# Patient Record
Sex: MALE | Race: WHITE | NOT HISPANIC OR LATINO
[De-identification: names, ages, dates, MRNs, and addresses within clinical notes are randomized per-mention and may not be internally consistent; named-entity substitution may affect disease eponyms.]

---

## 2017-05-08 ENCOUNTER — RX RENEWAL (OUTPATIENT)
Age: 57
End: 2017-05-08

## 2017-07-05 ENCOUNTER — EMERGENCY (EMERGENCY)
Facility: HOSPITAL | Age: 57
LOS: 0 days | Discharge: HOME | End: 2017-07-05
Attending: EMERGENCY MEDICINE | Admitting: EMERGENCY MEDICINE
Payer: COMMERCIAL

## 2017-07-05 VITALS
SYSTOLIC BLOOD PRESSURE: 103 MMHG | HEART RATE: 64 BPM | OXYGEN SATURATION: 98 % | RESPIRATION RATE: 13 BRPM | DIASTOLIC BLOOD PRESSURE: 85 MMHG

## 2017-07-05 VITALS — HEIGHT: 67 IN | WEIGHT: 210.1 LBS

## 2017-07-05 DIAGNOSIS — R07.89 OTHER CHEST PAIN: ICD-10-CM

## 2017-07-05 DIAGNOSIS — R07.9 CHEST PAIN, UNSPECIFIED: ICD-10-CM

## 2017-07-05 DIAGNOSIS — E78.5 HYPERLIPIDEMIA, UNSPECIFIED: ICD-10-CM

## 2017-07-05 LAB
ALBUMIN SERPL ELPH-MCNC: 3.4 G/DL — SIGNIFICANT CHANGE UP (ref 3.3–5)
ALP SERPL-CCNC: 85 U/L — SIGNIFICANT CHANGE UP (ref 40–120)
ALT FLD-CCNC: 32 U/L — SIGNIFICANT CHANGE UP (ref 12–78)
ANION GAP SERPL CALC-SCNC: 8 MMOL/L — SIGNIFICANT CHANGE UP (ref 5–17)
APTT BLD: 31.4 SEC — SIGNIFICANT CHANGE UP (ref 27.5–37.4)
AST SERPL-CCNC: 14 U/L — LOW (ref 15–37)
BASOPHILS # BLD AUTO: 0.1 K/UL — SIGNIFICANT CHANGE UP (ref 0–0.2)
BASOPHILS NFR BLD AUTO: 1.6 % — SIGNIFICANT CHANGE UP (ref 0–2)
BILIRUB SERPL-MCNC: 0.4 MG/DL — SIGNIFICANT CHANGE UP (ref 0.2–1.2)
BUN SERPL-MCNC: 17 MG/DL — SIGNIFICANT CHANGE UP (ref 7–23)
CALCIUM SERPL-MCNC: 8.6 MG/DL — SIGNIFICANT CHANGE UP (ref 8.5–10.1)
CHLORIDE SERPL-SCNC: 109 MMOL/L — HIGH (ref 96–108)
CO2 SERPL-SCNC: 25 MMOL/L — SIGNIFICANT CHANGE UP (ref 22–31)
CREAT SERPL-MCNC: 1.29 MG/DL — SIGNIFICANT CHANGE UP (ref 0.5–1.3)
EOSINOPHIL # BLD AUTO: 0.1 K/UL — SIGNIFICANT CHANGE UP (ref 0–0.5)
EOSINOPHIL NFR BLD AUTO: 1.5 % — SIGNIFICANT CHANGE UP (ref 0–6)
GLUCOSE SERPL-MCNC: 114 MG/DL — HIGH (ref 70–99)
HCT VFR BLD CALC: 47.2 % — SIGNIFICANT CHANGE UP (ref 39–50)
HGB BLD-MCNC: 16.2 G/DL — SIGNIFICANT CHANGE UP (ref 13–17)
INR BLD: 0.95 RATIO — SIGNIFICANT CHANGE UP (ref 0.88–1.16)
LYMPHOCYTES # BLD AUTO: 2.5 K/UL — SIGNIFICANT CHANGE UP (ref 1–3.3)
LYMPHOCYTES # BLD AUTO: 32.7 % — SIGNIFICANT CHANGE UP (ref 13–44)
MCHC RBC-ENTMCNC: 29.4 PG — SIGNIFICANT CHANGE UP (ref 27–34)
MCHC RBC-ENTMCNC: 34.4 GM/DL — SIGNIFICANT CHANGE UP (ref 32–36)
MCV RBC AUTO: 85.5 FL — SIGNIFICANT CHANGE UP (ref 80–100)
MONOCYTES # BLD AUTO: 0.8 K/UL — SIGNIFICANT CHANGE UP (ref 0–0.9)
MONOCYTES NFR BLD AUTO: 10 % — SIGNIFICANT CHANGE UP (ref 2–14)
NEUTROPHILS # BLD AUTO: 4.1 K/UL — SIGNIFICANT CHANGE UP (ref 1.8–7.4)
NEUTROPHILS NFR BLD AUTO: 54.2 % — SIGNIFICANT CHANGE UP (ref 43–77)
PLATELET # BLD AUTO: 180 K/UL — SIGNIFICANT CHANGE UP (ref 150–400)
POTASSIUM SERPL-MCNC: 4.1 MMOL/L — SIGNIFICANT CHANGE UP (ref 3.5–5.3)
POTASSIUM SERPL-SCNC: 4.1 MMOL/L — SIGNIFICANT CHANGE UP (ref 3.5–5.3)
PROT SERPL-MCNC: 7.3 GM/DL — SIGNIFICANT CHANGE UP (ref 6–8.3)
PROTHROM AB SERPL-ACNC: 10.2 SEC — SIGNIFICANT CHANGE UP (ref 9.8–12.7)
RBC # BLD: 5.52 M/UL — SIGNIFICANT CHANGE UP (ref 4.2–5.8)
RBC # FLD: 11.6 % — SIGNIFICANT CHANGE UP (ref 10.3–14.5)
SODIUM SERPL-SCNC: 142 MMOL/L — SIGNIFICANT CHANGE UP (ref 135–145)
TROPONIN I SERPL-MCNC: <0.015 NG/ML — SIGNIFICANT CHANGE UP (ref 0.01–0.04)
TROPONIN I SERPL-MCNC: <0.015 NG/ML — SIGNIFICANT CHANGE UP (ref 0.01–0.04)
WBC # BLD: 7.5 K/UL — SIGNIFICANT CHANGE UP (ref 3.8–10.5)
WBC # FLD AUTO: 7.5 K/UL — SIGNIFICANT CHANGE UP (ref 3.8–10.5)

## 2017-07-05 PROCEDURE — 93010 ELECTROCARDIOGRAM REPORT: CPT

## 2017-07-05 PROCEDURE — 71010: CPT | Mod: 26

## 2017-07-05 PROCEDURE — 99284 EMERGENCY DEPT VISIT MOD MDM: CPT | Mod: 25

## 2017-07-05 RX ORDER — SODIUM CHLORIDE 9 MG/ML
3 INJECTION INTRAMUSCULAR; INTRAVENOUS; SUBCUTANEOUS ONCE
Qty: 0 | Refills: 0 | Status: COMPLETED | OUTPATIENT
Start: 2017-07-05 | End: 2017-07-05

## 2017-07-05 RX ORDER — ASPIRIN/CALCIUM CARB/MAGNESIUM 324 MG
325 TABLET ORAL ONCE
Qty: 0 | Refills: 0 | Status: COMPLETED | OUTPATIENT
Start: 2017-07-05 | End: 2017-07-05

## 2017-07-05 RX ADMIN — SODIUM CHLORIDE 3 MILLILITER(S): 9 INJECTION INTRAMUSCULAR; INTRAVENOUS; SUBCUTANEOUS at 07:29

## 2017-07-05 RX ADMIN — Medication 325 MILLIGRAM(S): at 08:26

## 2017-07-05 NOTE — ED PROVIDER NOTE - OBJECTIVE STATEMENT
Pt notes that he performed yard work this week but denies trauma and pain is not worse with exertion or better with rest.

## 2017-07-05 NOTE — ED ADULT NURSE NOTE - OBJECTIVE STATEMENT
58 y/o male awake alert and oriented x4 presents to left sided chest pain radiates to left rib x 1 week. pt states chest pressure and palpitation started since yardening 1 week ago. 58 y/o male awake alert and oriented x4 presents to left sided chest pain radiates to left rib x 1 week. pt states chest pressure and palpitation started since yardening 1 week ago. Pt was getting ready for work today and [place] and came in for further evaluation. Vitals stable. c/o 5/10 chest pressure. Denies radiation to jaws and arms. wife at bedside. Denies sob,ha,dz,n/v/d/fever/chills or urinary sx. Denies cardiac hx.

## 2017-07-05 NOTE — ED ADULT TRIAGE NOTE - CHIEF COMPLAINT QUOTE
Chest pain and palpitations x 1 week, however this morning the palpitations are a/w left arm discomfort.  Patient denies SOB

## 2017-07-05 NOTE — ED PROVIDER NOTE - MEDICAL DECISION MAKING DETAILS
The patient's risk factors for ACS were reviewed as well as the EKG.  The CXR assists in r/o Pneumonia, Pneumothorax, Esophageal Tears.  The patient does not appear to have a Pulmonary Embolism based on the Wells Score and PERC rule and there is no apparent DVT.  There are no signs of Pericarditis, Endocarditis, or Myocarditis based on risk factor analysis.  There is no fever.  There does not appear to be an Aortic Dissection either based on history, physical exam, and signs.

## 2017-07-05 NOTE — ED PROVIDER NOTE - CHPI ED SYMPTOMS NEG
no cough/no syncope/no fever/no shortness of breath/no back pain/no dizziness/no nausea/no vomiting/no chills/no diaphoresis

## 2017-07-14 ENCOUNTER — APPOINTMENT (OUTPATIENT)
Dept: FAMILY MEDICINE | Facility: CLINIC | Age: 57
End: 2017-07-14

## 2017-07-14 VITALS
HEART RATE: 75 BPM | DIASTOLIC BLOOD PRESSURE: 70 MMHG | HEIGHT: 67 IN | BODY MASS INDEX: 34.84 KG/M2 | SYSTOLIC BLOOD PRESSURE: 110 MMHG | WEIGHT: 222 LBS

## 2017-07-14 VITALS — DIASTOLIC BLOOD PRESSURE: 68 MMHG | SYSTOLIC BLOOD PRESSURE: 95 MMHG

## 2017-07-14 DIAGNOSIS — M77.01 MEDIAL EPICONDYLITIS, RIGHT ELBOW: ICD-10-CM

## 2017-07-14 DIAGNOSIS — R07.89 OTHER CHEST PAIN: ICD-10-CM

## 2017-07-14 RX ORDER — ASPIRIN ENTERIC COATED TABLETS 81 MG 81 MG/1
81 TABLET, DELAYED RELEASE ORAL DAILY
Refills: 0 | Status: ACTIVE | COMMUNITY
Start: 2017-07-14

## 2017-07-25 ENCOUNTER — APPOINTMENT (OUTPATIENT)
Dept: FAMILY MEDICINE | Facility: CLINIC | Age: 57
End: 2017-07-25

## 2017-09-18 ENCOUNTER — RX RENEWAL (OUTPATIENT)
Age: 57
End: 2017-09-18

## 2017-10-23 ENCOUNTER — RX RENEWAL (OUTPATIENT)
Age: 57
End: 2017-10-23

## 2017-12-28 NOTE — ED PROVIDER NOTE - PSYCHIATRIC, MLM
COUGH/chest congestion/tightness, cough with productive greenish phlegm Alert and oriented to person, place, time/situation. normal mood and affect. no apparent risk to self or others.

## 2018-04-23 ENCOUNTER — RX RENEWAL (OUTPATIENT)
Age: 58
End: 2018-04-23

## 2018-04-30 ENCOUNTER — RX RENEWAL (OUTPATIENT)
Age: 58
End: 2018-04-30

## 2018-06-06 ENCOUNTER — APPOINTMENT (OUTPATIENT)
Dept: FAMILY MEDICINE | Facility: CLINIC | Age: 58
End: 2018-06-06
Payer: COMMERCIAL

## 2018-06-06 ENCOUNTER — NON-APPOINTMENT (OUTPATIENT)
Age: 58
End: 2018-06-06

## 2018-06-06 VITALS
HEIGHT: 67 IN | SYSTOLIC BLOOD PRESSURE: 108 MMHG | HEART RATE: 71 BPM | OXYGEN SATURATION: 100 % | DIASTOLIC BLOOD PRESSURE: 60 MMHG | WEIGHT: 223 LBS | BODY MASS INDEX: 35 KG/M2

## 2018-06-06 VITALS — DIASTOLIC BLOOD PRESSURE: 62 MMHG | SYSTOLIC BLOOD PRESSURE: 118 MMHG

## 2018-06-06 DIAGNOSIS — Z12.5 ENCOUNTER FOR SCREENING FOR MALIGNANT NEOPLASM OF PROSTATE: ICD-10-CM

## 2018-06-06 DIAGNOSIS — Z11.59 ENCOUNTER FOR SCREENING FOR OTHER VIRAL DISEASES: ICD-10-CM

## 2018-06-06 PROCEDURE — 99396 PREV VISIT EST AGE 40-64: CPT | Mod: 25

## 2018-06-06 PROCEDURE — 93000 ELECTROCARDIOGRAM COMPLETE: CPT

## 2018-06-06 PROCEDURE — 36415 COLL VENOUS BLD VENIPUNCTURE: CPT

## 2018-06-06 RX ORDER — DICLOFENAC SODIUM 10 MG/G
1 GEL TOPICAL
Qty: 100 | Refills: 3 | Status: DISCONTINUED | COMMUNITY
Start: 2017-07-14 | End: 2018-06-06

## 2018-06-06 NOTE — COUNSELING
[Activity counseling provided] : activity [___ min/wk activity recommended] : [unfilled] min/wk activity recommended [Walking] : Walking

## 2018-06-06 NOTE — PHYSICAL EXAM
[No Acute Distress] : no acute distress [Well Nourished] : well nourished [Well Developed] : well developed [Well-Appearing] : well-appearing [EOMI] : extraocular movements intact [PERRL] : pupils equal round and reactive to light [Normal Oropharynx] : the oropharynx was normal [No JVD] : no jugular venous distention [Supple] : supple [No Lymphadenopathy] : no lymphadenopathy [No Respiratory Distress] : no respiratory distress  [Clear to Auscultation] : lungs were clear to auscultation bilaterally [No Accessory Muscle Use] : no accessory muscle use [Normal Rate] : normal rate  [Regular Rhythm] : with a regular rhythm [Normal S1, S2] : normal S1 and S2 [No Carotid Bruits] : no carotid bruits [No Abdominal Bruit] : a ~M bruit was not heard ~T in the abdomen [Pedal Pulses Present] : the pedal pulses are present [No Edema] : there was no peripheral edema [No Extremity Clubbing/Cyanosis] : no extremity clubbing/cyanosis [No Palpable Aorta] : no palpable aorta [Normal Appearance] : normal in appearance [Soft] : abdomen soft [Non Tender] : non-tender [Non-distended] : non-distended [No Masses] : no abdominal mass palpated [No HSM] : no HSM [Normal Bowel Sounds] : normal bowel sounds [Normal Posterior Cervical Nodes] : no posterior cervical lymphadenopathy [Normal Anterior Cervical Nodes] : no anterior cervical lymphadenopathy [No CVA Tenderness] : no CVA  tenderness [No Spinal Tenderness] : no spinal tenderness [No Joint Swelling] : no joint swelling [Grossly Normal Strength/Tone] : grossly normal strength/tone [No Rash] : no rash [Normal Gait] : normal gait [Coordination Grossly Intact] : coordination grossly intact [No Focal Deficits] : no focal deficits [Normal Affect] : the affect was normal [Normal Mood] : the mood was normal [Normal Insight/Judgement] : insight and judgment were intact

## 2018-06-06 NOTE — HISTORY OF PRESENT ILLNESS
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  +FAST  Denies CP s/p Cardiology consult c Dr. Mayorga 1/18.  no CP/SOB c activity  pt states walks daily  c wife and dog x 1-1.5 hrs.    no dizziness no palpitations no N/V/D +BM qd-bid no bloody stools/black stools

## 2018-06-06 NOTE — HEALTH RISK ASSESSMENT
[HIV test declined] : HIV test declined [Hepatitis C test offered] : Hepatitis C test offered [None] : None [With Family] : lives with family [Employed] : employed [] :  [Sexually Active] : sexually active [Feels Safe at Home] : Feels safe at home [Fully functional (bathing, dressing, toileting, transferring, walking, feeding)] : Fully functional (bathing, dressing, toileting, transferring, walking, feeding) [Fully functional (using the telephone, shopping, preparing meals, housekeeping, doing laundry, using] : Fully functional and needs no help or supervision to perform IADLs (using the telephone, shopping, preparing meals, housekeeping, doing laundry, using transportation, managing medications and managing finances) [Change in mental status noted] : No change in mental status noted [Language] : denies difficulty with language [Behavior] : denies difficulty with behavior [Learning/Retaining New Information] : denies difficulty learning/retaining new information [Handling Complex Tasks] : denies difficulty handling complex tasks [Reasoning] : denies difficulty with reasoning [Spatial Ability and Orientation] : denies difficulty with spatial ability and orientation [Reports changes in hearing] : Reports no changes in hearing [Reports changes in vision] : Reports no changes in vision [Reports changes in dental health] : Reports no changes in dental health [ColonoscopyComments] : referral given today (Dr. Gunderson)

## 2018-06-07 ENCOUNTER — OTHER (OUTPATIENT)
Age: 58
End: 2018-06-07

## 2018-06-07 LAB
25(OH)D3 SERPL-MCNC: 64.3 NG/ML
ALBUMIN SERPL ELPH-MCNC: 4.4 G/DL
ALP BLD-CCNC: 97 U/L
ALT SERPL-CCNC: 28 U/L
ANION GAP SERPL CALC-SCNC: 15 MMOL/L
APPEARANCE: CLEAR
AST SERPL-CCNC: 19 U/L
BACTERIA: NEGATIVE
BASOPHILS # BLD AUTO: 0.03 K/UL
BASOPHILS NFR BLD AUTO: 0.5 %
BILIRUB SERPL-MCNC: 0.6 MG/DL
BILIRUBIN URINE: NEGATIVE
BLOOD URINE: NEGATIVE
BUN SERPL-MCNC: 16 MG/DL
CALCIUM SERPL-MCNC: 9.5 MG/DL
CHLORIDE SERPL-SCNC: 103 MMOL/L
CHOLEST SERPL-MCNC: 204 MG/DL
CHOLEST/HDLC SERPL: 3.8 RATIO
CK SERPL-CCNC: 89 U/L
CO2 SERPL-SCNC: 26 MMOL/L
COLOR: YELLOW
CREAT SERPL-MCNC: 1.32 MG/DL
EOSINOPHIL # BLD AUTO: 0.08 K/UL
EOSINOPHIL NFR BLD AUTO: 1.3 %
GGT SERPL-CCNC: 82 U/L
GLUCOSE QUALITATIVE U: NEGATIVE MG/DL
GLUCOSE SERPL-MCNC: 114 MG/DL
HBA1C MFR BLD HPLC: 5.9 %
HCT VFR BLD CALC: 49 %
HCV AB SER QL: NONREACTIVE
HCV S/CO RATIO: 0.16 S/CO
HDLC SERPL-MCNC: 53 MG/DL
HGB BLD-MCNC: 16.2 G/DL
IMM GRANULOCYTES NFR BLD AUTO: 0.5 %
KETONES URINE: NEGATIVE
LDLC SERPL CALC-MCNC: 127 MG/DL
LEUKOCYTE ESTERASE URINE: NEGATIVE
LYMPHOCYTES # BLD AUTO: 1.82 K/UL
LYMPHOCYTES NFR BLD AUTO: 28.5 %
MAN DIFF?: NORMAL
MCHC RBC-ENTMCNC: 29.3 PG
MCHC RBC-ENTMCNC: 33.1 GM/DL
MCV RBC AUTO: 88.8 FL
MICROSCOPIC-UA: NORMAL
MONOCYTES # BLD AUTO: 0.54 K/UL
MONOCYTES NFR BLD AUTO: 8.5 %
NEUTROPHILS # BLD AUTO: 3.89 K/UL
NEUTROPHILS NFR BLD AUTO: 60.7 %
NITRITE URINE: NEGATIVE
PH URINE: 6.5
PLATELET # BLD AUTO: 199 K/UL
POTASSIUM SERPL-SCNC: 4.8 MMOL/L
PROT SERPL-MCNC: 7.4 G/DL
PROTEIN URINE: NEGATIVE MG/DL
PSA FREE FLD-MCNC: 19.8
PSA FREE SERPL-MCNC: 0.22 NG/ML
PSA SERPL-MCNC: 1.11 NG/ML
RBC # BLD: 5.52 M/UL
RBC # FLD: 13.5 %
RED BLOOD CELLS URINE: 1 /HPF
SODIUM SERPL-SCNC: 144 MMOL/L
SPECIFIC GRAVITY URINE: 1.01
SQUAMOUS EPITHELIAL CELLS: 0 /HPF
T3 SERPL-MCNC: 116 NG/DL
T4 SERPL-MCNC: 8.1 UG/DL
TRIGL SERPL-MCNC: 119 MG/DL
TSH SERPL-ACNC: 2.52 UIU/ML
URATE SERPL-MCNC: 8.6 MG/DL
UROBILINOGEN URINE: NEGATIVE MG/DL
WBC # FLD AUTO: 6.39 K/UL
WHITE BLOOD CELLS URINE: 0 /HPF

## 2018-06-13 ENCOUNTER — MEDICATION RENEWAL (OUTPATIENT)
Age: 58
End: 2018-06-13

## 2018-06-13 RX ORDER — TADALAFIL 5 MG/1
5 TABLET, FILM COATED ORAL
Qty: 90 | Refills: 3 | Status: DISCONTINUED | COMMUNITY
Start: 2018-06-06 | End: 2018-06-13

## 2018-09-10 ENCOUNTER — APPOINTMENT (OUTPATIENT)
Dept: FAMILY MEDICINE | Facility: CLINIC | Age: 58
End: 2018-09-10

## 2018-10-23 ENCOUNTER — RX RENEWAL (OUTPATIENT)
Age: 58
End: 2018-10-23

## 2018-11-20 PROBLEM — E78.5 HYPERLIPIDEMIA, UNSPECIFIED: Chronic | Status: ACTIVE | Noted: 2017-07-05

## 2018-12-12 ENCOUNTER — APPOINTMENT (OUTPATIENT)
Dept: FAMILY MEDICINE | Facility: CLINIC | Age: 58
End: 2018-12-12
Payer: COMMERCIAL

## 2018-12-12 VITALS
HEART RATE: 79 BPM | SYSTOLIC BLOOD PRESSURE: 118 MMHG | BODY MASS INDEX: 35.16 KG/M2 | DIASTOLIC BLOOD PRESSURE: 74 MMHG | HEIGHT: 67 IN | WEIGHT: 224 LBS | OXYGEN SATURATION: 99 %

## 2018-12-12 DIAGNOSIS — N28.9 DISORDER OF KIDNEY AND URETER, UNSPECIFIED: ICD-10-CM

## 2018-12-12 DIAGNOSIS — Z12.11 ENCOUNTER FOR SCREENING FOR MALIGNANT NEOPLASM OF COLON: ICD-10-CM

## 2018-12-12 PROCEDURE — 99214 OFFICE O/P EST MOD 30 MIN: CPT | Mod: 25

## 2018-12-12 PROCEDURE — 36415 COLL VENOUS BLD VENIPUNCTURE: CPT

## 2018-12-12 NOTE — HISTORY OF PRESENT ILLNESS
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   no  CP/SOB c activity no dizziness no palpitations \par no N/V/D +BM qd NL no bloody/black stools, no urinary complaints \par \par pt walks daily x 1 hr  denies exertional SOB or CP

## 2018-12-12 NOTE — PHYSICAL EXAM
[No Acute Distress] : no acute distress [Well Nourished] : well nourished [Well Developed] : well developed [Well-Appearing] : well-appearing [PERRL] : pupils equal round and reactive to light [EOMI] : extraocular movements intact [Normal Outer Ear/Nose] : the outer ears and nose were normal in appearance [No JVD] : no jugular venous distention [Supple] : supple [No Lymphadenopathy] : no lymphadenopathy [No Respiratory Distress] : no respiratory distress  [Clear to Auscultation] : lungs were clear to auscultation bilaterally [No Accessory Muscle Use] : no accessory muscle use [Normal Rate] : normal rate  [Regular Rhythm] : with a regular rhythm [Normal S1, S2] : normal S1 and S2 [No Edema] : there was no peripheral edema [Non-distended] : non-distended [No CVA Tenderness] : no CVA  tenderness [Grossly Normal Strength/Tone] : grossly normal strength/tone [No Rash] : no rash [Normal Gait] : normal gait [Coordination Grossly Intact] : coordination grossly intact [No Focal Deficits] : no focal deficits [Normal Affect] : the affect was normal [Normal Mood] : the mood was normal [Normal Insight/Judgement] : insight and judgment were intact

## 2018-12-13 LAB
25(OH)D3 SERPL-MCNC: 60.8 NG/ML
ALBUMIN SERPL ELPH-MCNC: 4.5 G/DL
ALP BLD-CCNC: 97 U/L
ALT SERPL-CCNC: 32 U/L
ANION GAP SERPL CALC-SCNC: 14 MMOL/L
AST SERPL-CCNC: 17 U/L
BASOPHILS # BLD AUTO: 0.03 K/UL
BASOPHILS NFR BLD AUTO: 0.5 %
BILIRUB SERPL-MCNC: 0.6 MG/DL
BUN SERPL-MCNC: 13 MG/DL
CALCIUM SERPL-MCNC: 9.8 MG/DL
CHLORIDE SERPL-SCNC: 103 MMOL/L
CHOLEST SERPL-MCNC: 192 MG/DL
CHOLEST/HDLC SERPL: 3.9 RATIO
CK SERPL-CCNC: 100 U/L
CO2 SERPL-SCNC: 24 MMOL/L
CREAT SERPL-MCNC: 1.18 MG/DL
EOSINOPHIL # BLD AUTO: 0.08 K/UL
EOSINOPHIL NFR BLD AUTO: 1.4 %
GGT SERPL-CCNC: 84 U/L
GLUCOSE SERPL-MCNC: 115 MG/DL
HBA1C MFR BLD HPLC: 5.8 %
HCT VFR BLD CALC: 49.1 %
HDLC SERPL-MCNC: 49 MG/DL
HGB BLD-MCNC: 16.1 G/DL
IMM GRANULOCYTES NFR BLD AUTO: 0.7 %
LDLC SERPL CALC-MCNC: 110 MG/DL
LYMPHOCYTES # BLD AUTO: 1.71 K/UL
LYMPHOCYTES NFR BLD AUTO: 29.8 %
MAN DIFF?: NORMAL
MCHC RBC-ENTMCNC: 29.2 PG
MCHC RBC-ENTMCNC: 32.8 GM/DL
MCV RBC AUTO: 88.9 FL
MONOCYTES # BLD AUTO: 0.39 K/UL
MONOCYTES NFR BLD AUTO: 6.8 %
NEUTROPHILS # BLD AUTO: 3.48 K/UL
NEUTROPHILS NFR BLD AUTO: 60.8 %
PLATELET # BLD AUTO: 195 K/UL
POTASSIUM SERPL-SCNC: 5.3 MMOL/L
PROT SERPL-MCNC: 7.1 G/DL
RBC # BLD: 5.52 M/UL
RBC # FLD: 13.8 %
SODIUM SERPL-SCNC: 141 MMOL/L
T3 SERPL-MCNC: 138 NG/DL
T4 SERPL-MCNC: 8.3 UG/DL
TRIGL SERPL-MCNC: 163 MG/DL
TSH SERPL-ACNC: 2.7 UIU/ML
URATE SERPL-MCNC: 8 MG/DL
WBC # FLD AUTO: 5.73 K/UL

## 2019-04-25 ENCOUNTER — RX RENEWAL (OUTPATIENT)
Age: 59
End: 2019-04-25

## 2019-05-07 ENCOUNTER — RX RENEWAL (OUTPATIENT)
Age: 59
End: 2019-05-07

## 2019-07-28 ENCOUNTER — RX RENEWAL (OUTPATIENT)
Age: 59
End: 2019-07-28

## 2020-01-06 ENCOUNTER — RESULT CHARGE (OUTPATIENT)
Age: 60
End: 2020-01-06

## 2020-01-07 ENCOUNTER — APPOINTMENT (OUTPATIENT)
Dept: FAMILY MEDICINE | Facility: CLINIC | Age: 60
End: 2020-01-07
Payer: COMMERCIAL

## 2020-01-07 ENCOUNTER — NON-APPOINTMENT (OUTPATIENT)
Age: 60
End: 2020-01-07

## 2020-01-07 VITALS
HEART RATE: 72 BPM | HEIGHT: 67 IN | BODY MASS INDEX: 33.43 KG/M2 | WEIGHT: 213 LBS | DIASTOLIC BLOOD PRESSURE: 82 MMHG | SYSTOLIC BLOOD PRESSURE: 112 MMHG | OXYGEN SATURATION: 96 %

## 2020-01-07 DIAGNOSIS — Z13.29 ENCOUNTER FOR SCREENING FOR OTHER SUSPECTED ENDOCRINE DISORDER: ICD-10-CM

## 2020-01-07 DIAGNOSIS — Z13.21 ENCOUNTER FOR SCREENING FOR NUTRITIONAL DISORDER: ICD-10-CM

## 2020-01-07 PROCEDURE — 99396 PREV VISIT EST AGE 40-64: CPT | Mod: 25

## 2020-01-07 PROCEDURE — 93000 ELECTROCARDIOGRAM COMPLETE: CPT

## 2020-01-07 PROCEDURE — 36415 COLL VENOUS BLD VENIPUNCTURE: CPT

## 2020-01-07 NOTE — HISTORY OF PRESENT ILLNESS
[FreeTextEntry1] : CPE [de-identified] : CPE \par as above,  +ve FAST,   no CP/SOB c activity no dizziness no palpitations no N/V/D +BM qd NL no bloody/black stools denies acute change in bowel habits   no urinary complaints

## 2020-01-07 NOTE — PHYSICAL EXAM
[No Acute Distress] : no acute distress [Well Nourished] : well nourished [Well Developed] : well developed [Well-Appearing] : well-appearing [EOMI] : extraocular movements intact [Normal Outer Ear/Nose] : the outer ears and nose were normal in appearance [No JVD] : no jugular venous distention [No Lymphadenopathy] : no lymphadenopathy [No Respiratory Distress] : no respiratory distress  [Supple] : supple [No Accessory Muscle Use] : no accessory muscle use [Clear to Auscultation] : lungs were clear to auscultation bilaterally [Regular Rhythm] : with a regular rhythm [Normal Rate] : normal rate  [Normal S1, S2] : normal S1 and S2 [No Carotid Bruits] : no carotid bruits [No Abdominal Bruit] : a ~M bruit was not heard ~T in the abdomen [No Edema] : there was no peripheral edema [No Palpable Aorta] : no palpable aorta [No Extremity Clubbing/Cyanosis] : no extremity clubbing/cyanosis [Normal Appearance] : normal in appearance [Non Tender] : non-tender [Soft] : abdomen soft [Non-distended] : non-distended [No Masses] : no abdominal mass palpated [Normal Bowel Sounds] : normal bowel sounds [No HSM] : no HSM [Normal Posterior Cervical Nodes] : no posterior cervical lymphadenopathy [Normal Anterior Cervical Nodes] : no anterior cervical lymphadenopathy [No CVA Tenderness] : no CVA  tenderness [Grossly Normal Strength/Tone] : grossly normal strength/tone [No Rash] : no rash [Coordination Grossly Intact] : coordination grossly intact [Normal Gait] : normal gait [No Focal Deficits] : no focal deficits [Normal Mood] : the mood was normal [Normal Affect] : the affect was normal [Normal Insight/Judgement] : insight and judgment were intact

## 2020-01-07 NOTE — ASSESSMENT
[FreeTextEntry1] : EKG performed: SR at 80 bpm, normal axis, no ST/T changes \par \par see lab orders \par \par see med orders \par \par see cologuard order

## 2020-01-08 LAB
25(OH)D3 SERPL-MCNC: 92.5 NG/ML
ALBUMIN SERPL ELPH-MCNC: 4.4 G/DL
ALP BLD-CCNC: 100 U/L
ALT SERPL-CCNC: 26 U/L
ANION GAP SERPL CALC-SCNC: 16 MMOL/L
APPEARANCE: CLEAR
AST SERPL-CCNC: 18 U/L
BACTERIA: NEGATIVE
BASOPHILS # BLD AUTO: 0.06 K/UL
BASOPHILS NFR BLD AUTO: 0.9 %
BILIRUB SERPL-MCNC: 0.5 MG/DL
BILIRUBIN URINE: NEGATIVE
BLOOD URINE: NEGATIVE
BUN SERPL-MCNC: 17 MG/DL
CALCIUM SERPL-MCNC: 10.1 MG/DL
CHLORIDE SERPL-SCNC: 102 MMOL/L
CHOLEST SERPL-MCNC: 201 MG/DL
CHOLEST/HDLC SERPL: 4.2 RATIO
CK SERPL-CCNC: 70 U/L
CO2 SERPL-SCNC: 22 MMOL/L
COLOR: COLORLESS
CREAT SERPL-MCNC: 1.18 MG/DL
EOSINOPHIL # BLD AUTO: 0.07 K/UL
EOSINOPHIL NFR BLD AUTO: 1 %
ESTIMATED AVERAGE GLUCOSE: 120 MG/DL
GGT SERPL-CCNC: 76 U/L
GLUCOSE QUALITATIVE U: NEGATIVE
GLUCOSE SERPL-MCNC: 96 MG/DL
HBA1C MFR BLD HPLC: 5.8 %
HCT VFR BLD CALC: 51.1 %
HDLC SERPL-MCNC: 48 MG/DL
HGB BLD-MCNC: 16.9 G/DL
HYALINE CASTS: 0 /LPF
IMM GRANULOCYTES NFR BLD AUTO: 0.7 %
KETONES URINE: NEGATIVE
LDLC SERPL CALC-MCNC: 118 MG/DL
LEUKOCYTE ESTERASE URINE: NEGATIVE
LYMPHOCYTES # BLD AUTO: 1.53 K/UL
LYMPHOCYTES NFR BLD AUTO: 21.9 %
MAN DIFF?: NORMAL
MCHC RBC-ENTMCNC: 29.2 PG
MCHC RBC-ENTMCNC: 33.1 GM/DL
MCV RBC AUTO: 88.3 FL
MICROSCOPIC-UA: NORMAL
MONOCYTES # BLD AUTO: 0.72 K/UL
MONOCYTES NFR BLD AUTO: 10.3 %
NEUTROPHILS # BLD AUTO: 4.56 K/UL
NEUTROPHILS NFR BLD AUTO: 65.2 %
NITRITE URINE: NEGATIVE
PH URINE: 6.5
PLATELET # BLD AUTO: 186 K/UL
POTASSIUM SERPL-SCNC: 5.3 MMOL/L
PROT SERPL-MCNC: 7.5 G/DL
PROTEIN URINE: NEGATIVE
PSA FREE FLD-MCNC: 19 %
PSA FREE SERPL-MCNC: 0.25 NG/ML
PSA SERPL-MCNC: 1.3 NG/ML
RBC # BLD: 5.79 M/UL
RBC # FLD: 13.2 %
RED BLOOD CELLS URINE: 0 /HPF
SODIUM SERPL-SCNC: 140 MMOL/L
SPECIFIC GRAVITY URINE: 1.01
SQUAMOUS EPITHELIAL CELLS: 0 /HPF
T3 SERPL-MCNC: 123 NG/DL
T4 SERPL-MCNC: 8.5 UG/DL
TRIGL SERPL-MCNC: 176 MG/DL
TSH SERPL-ACNC: 3.36 UIU/ML
URATE SERPL-MCNC: 7.5 MG/DL
UROBILINOGEN URINE: NORMAL
WBC # FLD AUTO: 6.99 K/UL
WHITE BLOOD CELLS URINE: 0 /HPF

## 2020-02-19 ENCOUNTER — APPOINTMENT (OUTPATIENT)
Dept: FAMILY MEDICINE | Facility: CLINIC | Age: 60
End: 2020-02-19
Payer: COMMERCIAL

## 2020-02-19 VITALS
WEIGHT: 213 LBS | HEART RATE: 70 BPM | SYSTOLIC BLOOD PRESSURE: 110 MMHG | OXYGEN SATURATION: 95 % | BODY MASS INDEX: 33.43 KG/M2 | DIASTOLIC BLOOD PRESSURE: 80 MMHG | HEIGHT: 67 IN

## 2020-02-19 PROCEDURE — 36415 COLL VENOUS BLD VENIPUNCTURE: CPT

## 2020-02-19 PROCEDURE — 99214 OFFICE O/P EST MOD 30 MIN: CPT | Mod: 25

## 2020-02-19 NOTE — PHYSICAL EXAM
[No Acute Distress] : no acute distress [Well Nourished] : well nourished [Well Developed] : well developed [Well-Appearing] : well-appearing [EOMI] : extraocular movements intact [Normal Outer Ear/Nose] : the outer ears and nose were normal in appearance [No JVD] : no jugular venous distention [No Lymphadenopathy] : no lymphadenopathy [Supple] : supple [No Respiratory Distress] : no respiratory distress  [No Accessory Muscle Use] : no accessory muscle use [Clear to Auscultation] : lungs were clear to auscultation bilaterally [Normal Rate] : normal rate  [Regular Rhythm] : with a regular rhythm [Normal S1, S2] : normal S1 and S2 [No Carotid Bruits] : no carotid bruits [No Abdominal Bruit] : a ~M bruit was not heard ~T in the abdomen [No Edema] : there was no peripheral edema [No Palpable Aorta] : no palpable aorta [No Extremity Clubbing/Cyanosis] : no extremity clubbing/cyanosis [Normal Appearance] : normal in appearance [Soft] : abdomen soft [Non Tender] : non-tender [Non-distended] : non-distended [No Masses] : no abdominal mass palpated [No HSM] : no HSM [Normal Bowel Sounds] : normal bowel sounds [Normal Posterior Cervical Nodes] : no posterior cervical lymphadenopathy [Normal Anterior Cervical Nodes] : no anterior cervical lymphadenopathy [No CVA Tenderness] : no CVA  tenderness [Grossly Normal Strength/Tone] : grossly normal strength/tone [No Rash] : no rash [Coordination Grossly Intact] : coordination grossly intact [No Focal Deficits] : no focal deficits [Normal Gait] : normal gait [Normal Affect] : the affect was normal [Normal Mood] : the mood was normal [Normal Insight/Judgement] : insight and judgment were intact

## 2020-02-19 NOTE — HISTORY OF PRESENT ILLNESS
[FreeTextEntry1] : 6 week f/u HLD/Elevated Vit D [de-identified] : 61 yo male for f/u on excess vit D.  pt compliant c HOLD of Vit D  "8KU" daily     \par pt reports compliance c improved lifestyle  +ve FAST

## 2020-02-22 LAB
25(OH)D3 SERPL-MCNC: 64.4 NG/ML
CHOLEST SERPL-MCNC: 191 MG/DL
CHOLEST/HDLC SERPL: 3.9 RATIO
HDLC SERPL-MCNC: 50 MG/DL
LDLC SERPL CALC-MCNC: 107 MG/DL
TRIGL SERPL-MCNC: 169 MG/DL

## 2020-02-24 LAB
TESTOST BND SERPL-MCNC: 10.1 PG/ML
TESTOST SERPL-MCNC: 390.2 NG/DL

## 2020-04-10 ENCOUNTER — TRANSCRIPTION ENCOUNTER (OUTPATIENT)
Age: 60
End: 2020-04-10

## 2020-10-05 ENCOUNTER — RX RENEWAL (OUTPATIENT)
Age: 60
End: 2020-10-05

## 2021-01-24 ENCOUNTER — RX RENEWAL (OUTPATIENT)
Age: 61
End: 2021-01-24

## 2021-01-26 ENCOUNTER — RX RENEWAL (OUTPATIENT)
Age: 61
End: 2021-01-26

## 2021-05-14 ENCOUNTER — RX RENEWAL (OUTPATIENT)
Age: 61
End: 2021-05-14

## 2021-05-21 ENCOUNTER — APPOINTMENT (OUTPATIENT)
Dept: FAMILY MEDICINE | Facility: CLINIC | Age: 61
End: 2021-05-21
Payer: COMMERCIAL

## 2021-05-21 VITALS
SYSTOLIC BLOOD PRESSURE: 120 MMHG | TEMPERATURE: 98 F | WEIGHT: 223 LBS | OXYGEN SATURATION: 98 % | HEIGHT: 67 IN | BODY MASS INDEX: 35 KG/M2 | HEART RATE: 72 BPM | DIASTOLIC BLOOD PRESSURE: 80 MMHG

## 2021-05-21 VITALS — DIASTOLIC BLOOD PRESSURE: 70 MMHG | SYSTOLIC BLOOD PRESSURE: 112 MMHG

## 2021-05-21 DIAGNOSIS — N52.9 MALE ERECTILE DYSFUNCTION, UNSPECIFIED: ICD-10-CM

## 2021-05-21 PROCEDURE — 36415 COLL VENOUS BLD VENIPUNCTURE: CPT

## 2021-05-21 PROCEDURE — 99072 ADDL SUPL MATRL&STAF TM PHE: CPT

## 2021-05-21 PROCEDURE — 99396 PREV VISIT EST AGE 40-64: CPT | Mod: 25

## 2021-05-21 RX ORDER — SILDENAFIL 20 MG/1
20 TABLET ORAL
Qty: 50 | Refills: 1 | Status: DISCONTINUED | COMMUNITY
Start: 2020-01-07 | End: 2021-05-21

## 2021-05-21 NOTE — PHYSICAL EXAM
[No Acute Distress] : no acute distress [Well Nourished] : well nourished [Well Developed] : well developed [Well-Appearing] : well-appearing [EOMI] : extraocular movements intact [Normal Outer Ear/Nose] : the outer ears and nose were normal in appearance [No JVD] : no jugular venous distention [No Respiratory Distress] : no respiratory distress  [No Accessory Muscle Use] : no accessory muscle use [Clear to Auscultation] : lungs were clear to auscultation bilaterally [Normal Rate] : normal rate  [Regular Rhythm] : with a regular rhythm [Normal S1, S2] : normal S1 and S2 [No Carotid Bruits] : no carotid bruits [No Edema] : there was no peripheral edema [Normal Appearance] : normal in appearance [Soft] : abdomen soft [Non Tender] : non-tender [Non-distended] : non-distended [No Masses] : no abdominal mass palpated [No HSM] : no HSM [Normal Bowel Sounds] : normal bowel sounds [No CVA Tenderness] : no CVA  tenderness [Grossly Normal Strength/Tone] : grossly normal strength/tone [No Rash] : no rash [Coordination Grossly Intact] : coordination grossly intact [No Focal Deficits] : no focal deficits [Normal Gait] : normal gait [Normal Affect] : the affect was normal [Normal Mood] : the mood was normal [Normal Insight/Judgement] : insight and judgment were intact

## 2021-05-21 NOTE — PLAN
[FreeTextEntry1] : EKG UTD and reviewed from Cardiology 2/9/21 \par \par see lab orders\par \par see referral

## 2021-05-21 NOTE — HISTORY OF PRESENT ILLNESS
[FreeTextEntry1] : CPE [de-identified] : CPE \par as above +ve FAST no CP/SOB c activity no dizziness no  palpitations no N/V/D +BM qd NL no bloody/black stools  pt currently managing GERD c PPI therapy QOD \par no acute change in bowel  habits no urinary complaints \par  \par Denies f/c/s no cough sense of smell/taste intact \par \par pt reports persistent  ED despite treatment c Cialis and Viagra at maximum dose \par \par pt engages in daily walks for approx 1 hr

## 2021-05-21 NOTE — HEALTH RISK ASSESSMENT
[Patient reported colonoscopy was normal] : Patient reported colonoscopy was normal [ColonoscopyDate] : 01/20 [ColonoscopyComments] : Cologuard NEGATIVE  repeat 1/14/2023

## 2021-05-24 LAB
25(OH)D3 SERPL-MCNC: 64.4 NG/ML
ALBUMIN SERPL ELPH-MCNC: 4.3 G/DL
ALP BLD-CCNC: 90 U/L
ALT SERPL-CCNC: 24 U/L
ANION GAP SERPL CALC-SCNC: 13 MMOL/L
APPEARANCE: CLEAR
AST SERPL-CCNC: 15 U/L
BACTERIA: NEGATIVE
BASOPHILS # BLD AUTO: 0.06 K/UL
BASOPHILS NFR BLD AUTO: 0.9 %
BILIRUB SERPL-MCNC: 0.4 MG/DL
BILIRUBIN URINE: NEGATIVE
BLOOD URINE: NEGATIVE
BUN SERPL-MCNC: 18 MG/DL
CALCIUM SERPL-MCNC: 9 MG/DL
CHLORIDE SERPL-SCNC: 105 MMOL/L
CHOLEST SERPL-MCNC: 181 MG/DL
CK SERPL-CCNC: 102 U/L
CO2 SERPL-SCNC: 22 MMOL/L
COLOR: NORMAL
COVID-19 SPIKE DOMAIN ANTIBODY INTERPRETATION: POSITIVE
CREAT SERPL-MCNC: 1.28 MG/DL
EOSINOPHIL # BLD AUTO: 0.09 K/UL
EOSINOPHIL NFR BLD AUTO: 1.4 %
ESTIMATED AVERAGE GLUCOSE: 123 MG/DL
GGT SERPL-CCNC: 62 U/L
GLUCOSE QUALITATIVE U: NEGATIVE
GLUCOSE SERPL-MCNC: 113 MG/DL
HBA1C MFR BLD HPLC: 5.9 %
HCT VFR BLD CALC: 48.5 %
HDLC SERPL-MCNC: 49 MG/DL
HGB BLD-MCNC: 15.7 G/DL
HYALINE CASTS: 0 /LPF
IMM GRANULOCYTES NFR BLD AUTO: 0.8 %
KETONES URINE: NEGATIVE
LDLC SERPL CALC-MCNC: 109 MG/DL
LEUKOCYTE ESTERASE URINE: NEGATIVE
LYMPHOCYTES # BLD AUTO: 1.54 K/UL
LYMPHOCYTES NFR BLD AUTO: 23.6 %
MAN DIFF?: NORMAL
MCHC RBC-ENTMCNC: 29.5 PG
MCHC RBC-ENTMCNC: 32.4 GM/DL
MCV RBC AUTO: 91 FL
MICROSCOPIC-UA: NORMAL
MONOCYTES # BLD AUTO: 0.63 K/UL
MONOCYTES NFR BLD AUTO: 9.7 %
NEUTROPHILS # BLD AUTO: 4.15 K/UL
NEUTROPHILS NFR BLD AUTO: 63.6 %
NITRITE URINE: NEGATIVE
NONHDLC SERPL-MCNC: 132 MG/DL
PH URINE: 6.5
PLATELET # BLD AUTO: 191 K/UL
POTASSIUM SERPL-SCNC: 4.6 MMOL/L
PROT SERPL-MCNC: 6.9 G/DL
PROTEIN URINE: NEGATIVE
PSA FREE FLD-MCNC: 18 %
PSA FREE SERPL-MCNC: 0.31 NG/ML
PSA SERPL-MCNC: 1.72 NG/ML
RBC # BLD: 5.33 M/UL
RBC # FLD: 13.4 %
RED BLOOD CELLS URINE: 1 /HPF
SARS-COV-2 AB SERPL IA-ACNC: >250 U/ML
SODIUM SERPL-SCNC: 140 MMOL/L
SPECIFIC GRAVITY URINE: 1.01
SQUAMOUS EPITHELIAL CELLS: 0 /HPF
T3FREE SERPL-MCNC: 2.98 PG/ML
T4 FREE SERPL-MCNC: 1.1 NG/DL
TRIGL SERPL-MCNC: 113 MG/DL
TSH SERPL-ACNC: 2.85 UIU/ML
URATE SERPL-MCNC: 8.7 MG/DL
UROBILINOGEN URINE: NORMAL
WBC # FLD AUTO: 6.52 K/UL
WHITE BLOOD CELLS URINE: 0 /HPF

## 2021-05-25 LAB
TESTOST BND SERPL-MCNC: 7.9 PG/ML
TESTOST SERPL-MCNC: 371.1 NG/DL

## 2021-07-19 ENCOUNTER — RX RENEWAL (OUTPATIENT)
Age: 61
End: 2021-07-19

## 2021-12-24 NOTE — ED PROVIDER NOTE - CONDUCTED A DETAILED DISCUSSION WITH PATIENT AND/OR GUARDIAN REGARDING, MDM
complains of pain/discomfort return to ED if symptoms worsen, persist or questions arise/radiology results/lab results/need for outpatient follow-up

## 2022-01-26 ENCOUNTER — RX RENEWAL (OUTPATIENT)
Age: 62
End: 2022-01-26

## 2022-06-22 ENCOUNTER — RX RENEWAL (OUTPATIENT)
Age: 62
End: 2022-06-22

## 2022-08-09 ENCOUNTER — RX RENEWAL (OUTPATIENT)
Age: 62
End: 2022-08-09

## 2022-08-26 ENCOUNTER — APPOINTMENT (OUTPATIENT)
Dept: FAMILY MEDICINE | Facility: CLINIC | Age: 62
End: 2022-08-26

## 2022-08-26 VITALS
TEMPERATURE: 97.7 F | OXYGEN SATURATION: 100 % | DIASTOLIC BLOOD PRESSURE: 76 MMHG | WEIGHT: 211 LBS | BODY MASS INDEX: 33.12 KG/M2 | SYSTOLIC BLOOD PRESSURE: 108 MMHG | HEIGHT: 67 IN | HEART RATE: 68 BPM

## 2022-08-26 VITALS — DIASTOLIC BLOOD PRESSURE: 60 MMHG | SYSTOLIC BLOOD PRESSURE: 92 MMHG

## 2022-08-26 DIAGNOSIS — Z00.00 ENCOUNTER FOR GENERAL ADULT MEDICAL EXAMINATION W/OUT ABNORMAL FINDINGS: ICD-10-CM

## 2022-08-26 DIAGNOSIS — Z13.1 ENCOUNTER FOR SCREENING FOR DIABETES MELLITUS: ICD-10-CM

## 2022-08-26 DIAGNOSIS — Z92.29 PERSONAL HISTORY OF OTHER DRUG THERAPY: ICD-10-CM

## 2022-08-26 PROCEDURE — G0444 DEPRESSION SCREEN ANNUAL: CPT | Mod: 59

## 2022-08-26 PROCEDURE — 99396 PREV VISIT EST AGE 40-64: CPT | Mod: 25

## 2022-08-26 PROCEDURE — 36415 COLL VENOUS BLD VENIPUNCTURE: CPT

## 2022-08-26 NOTE — HISTORY OF PRESENT ILLNESS
[FreeTextEntry1] : NICANOR is a 62 year old male here for a CPE. [de-identified] : CPE \par as above +ve FAST no CP/SOB c activity no dizziness no  palpitations no N/V/D +BM bid  NL no bloody/black stools \par no acute change in bowel  habits no urinary complaints    \par \par Denies f/c/s no cough sense of smell/taste intact \par \par pt denies known hx of  COVID-19 \par '

## 2022-08-26 NOTE — PLAN
[FreeTextEntry1] : Cardio consult UTD c Dr. Mayorga 3/2022,  repeat ischemic evaluation to be performed late Sept./Early October 2022 \par \par EKG performed:   SR at 71 bpm, normal axis, no ST/T changes  \par \par see lab orders

## 2022-08-26 NOTE — HEALTH RISK ASSESSMENT
[Yes] : Yes [2 - 4 times a month (2 pts)] : 2-4 times a month (2 points) [1 or 2 (0 pts)] : 1 or 2 (0 points) [Never (0 pts)] : Never (0 points) [No] : In the past 12 months have you used drugs other than those required for medical reasons? No [No falls in past year] : Patient reported no falls in the past year [0] : 2) Feeling down, depressed, or hopeless: Not at all (0) [PHQ-2 Negative - No further assessment needed] : PHQ-2 Negative - No further assessment needed [Audit-CScore] : 2 [ELD1Fgbll] : 0 [Patient reported colonoscopy was normal] : Patient reported colonoscopy was normal [ColonoscopyDate] : 1/20 [ColonoscopyComments] : Repeat Cologuard due  1/14/2023

## 2022-08-30 LAB
25(OH)D3 SERPL-MCNC: 79.9 NG/ML
ALBUMIN SERPL ELPH-MCNC: 4.4 G/DL
ALP BLD-CCNC: 85 U/L
ALT SERPL-CCNC: 30 U/L
ANION GAP SERPL CALC-SCNC: 11 MMOL/L
APPEARANCE: CLEAR
AST SERPL-CCNC: 18 U/L
BACTERIA: NEGATIVE
BASOPHILS # BLD AUTO: 0.03 K/UL
BASOPHILS NFR BLD AUTO: 0.5 %
BILIRUB SERPL-MCNC: 0.5 MG/DL
BILIRUBIN URINE: NEGATIVE
BLOOD URINE: NEGATIVE
BUN SERPL-MCNC: 18 MG/DL
CALCIUM SERPL-MCNC: 9.4 MG/DL
CHLORIDE SERPL-SCNC: 104 MMOL/L
CHOLEST SERPL-MCNC: 168 MG/DL
CK SERPL-CCNC: 76 U/L
CO2 SERPL-SCNC: 25 MMOL/L
COLOR: NORMAL
COVID-19 SPIKE DOMAIN ANTIBODY INTERPRETATION: POSITIVE
CREAT SERPL-MCNC: 1.14 MG/DL
EGFR: 72 ML/MIN/1.73M2
EOSINOPHIL # BLD AUTO: 0.1 K/UL
EOSINOPHIL NFR BLD AUTO: 1.6 %
ESTIMATED AVERAGE GLUCOSE: 117 MG/DL
GGT SERPL-CCNC: 70 U/L
GLUCOSE QUALITATIVE U: NEGATIVE
GLUCOSE SERPL-MCNC: 115 MG/DL
HBA1C MFR BLD HPLC: 5.7 %
HCT VFR BLD CALC: 49.3 %
HDLC SERPL-MCNC: 44 MG/DL
HGB BLD-MCNC: 16.2 G/DL
HYALINE CASTS: 0 /LPF
IMM GRANULOCYTES NFR BLD AUTO: 0.5 %
KETONES URINE: NEGATIVE
LDLC SERPL CALC-MCNC: 101 MG/DL
LEUKOCYTE ESTERASE URINE: NEGATIVE
LYMPHOCYTES # BLD AUTO: 1.33 K/UL
LYMPHOCYTES NFR BLD AUTO: 20.9 %
MAN DIFF?: NORMAL
MCHC RBC-ENTMCNC: 29.5 PG
MCHC RBC-ENTMCNC: 32.9 GM/DL
MCV RBC AUTO: 89.8 FL
MICROSCOPIC-UA: NORMAL
MONOCYTES # BLD AUTO: 0.55 K/UL
MONOCYTES NFR BLD AUTO: 8.6 %
NEUTROPHILS # BLD AUTO: 4.32 K/UL
NEUTROPHILS NFR BLD AUTO: 67.9 %
NITRITE URINE: NEGATIVE
NONHDLC SERPL-MCNC: 124 MG/DL
PH URINE: 6
PLATELET # BLD AUTO: 196 K/UL
POTASSIUM SERPL-SCNC: 4.6 MMOL/L
PROT SERPL-MCNC: 7.2 G/DL
PROTEIN URINE: NEGATIVE
PSA FREE FLD-MCNC: 20 %
PSA FREE SERPL-MCNC: 0.23 NG/ML
PSA SERPL-MCNC: 1.14 NG/ML
RBC # BLD: 5.49 M/UL
RBC # FLD: 13 %
RED BLOOD CELLS URINE: 1 /HPF
SARS-COV-2 AB SERPL IA-ACNC: >250 U/ML
SODIUM SERPL-SCNC: 140 MMOL/L
SPECIFIC GRAVITY URINE: 1.01
SQUAMOUS EPITHELIAL CELLS: 0 /HPF
T3FREE SERPL-MCNC: 3 PG/ML
T4 FREE SERPL-MCNC: 1.3 NG/DL
TRIGL SERPL-MCNC: 117 MG/DL
TSH SERPL-ACNC: 2.81 UIU/ML
URATE SERPL-MCNC: 7.9 MG/DL
UROBILINOGEN URINE: NORMAL
WBC # FLD AUTO: 6.36 K/UL
WHITE BLOOD CELLS URINE: 0 /HPF

## 2022-10-23 ENCOUNTER — TRANSCRIPTION ENCOUNTER (OUTPATIENT)
Age: 62
End: 2022-10-23

## 2022-10-24 ENCOUNTER — APPOINTMENT (OUTPATIENT)
Dept: FAMILY MEDICINE | Facility: CLINIC | Age: 62
End: 2022-10-24

## 2022-10-24 DIAGNOSIS — R09.81 NASAL CONGESTION: ICD-10-CM

## 2022-10-24 DIAGNOSIS — R05.9 COUGH, UNSPECIFIED: ICD-10-CM

## 2022-10-24 DIAGNOSIS — U07.1 COVID-19: ICD-10-CM

## 2022-10-24 DIAGNOSIS — Z71.89 OTHER SPECIFIED COUNSELING: ICD-10-CM

## 2022-10-24 PROCEDURE — 99213 OFFICE O/P EST LOW 20 MIN: CPT | Mod: 95

## 2022-10-24 RX ORDER — TRIAMCINOLONE ACETONIDE 55 UG/1
55 SPRAY, METERED NASAL
Qty: 1 | Refills: 1 | Status: ACTIVE | COMMUNITY
Start: 2022-10-24 | End: 1900-01-01

## 2022-10-24 NOTE — PLAN
[FreeTextEntry1] : cont vit D 6KU daily \par Add Vit C 1000 mg/day \par OTC Tylenol 1-2 tabs q8h prn \par Nasacort AQ 2 sp ea nostril  (see med order)\par see med orders \par \par close monitoring \par \par self isolation x 5d  (pt currently on Day 2 of symptoms)

## 2022-10-24 NOTE — PHYSICAL EXAM
[No Acute Distress] : no acute distress [Well Nourished] : well nourished [Well Developed] : well developed [Well-Appearing] : well-appearing [EOMI] : extraocular movements intact [Normal Outer Ear/Nose] : the outer ears and nose were normal in appearance [No JVD] : no jugular venous distention [No Respiratory Distress] : no respiratory distress  [No Accessory Muscle Use] : no accessory muscle use [Grossly Normal Strength/Tone] : grossly normal strength/tone [Coordination Grossly Intact] : coordination grossly intact [Speech Grossly Normal] : speech grossly normal [Normal Affect] : the affect was normal [Normal Insight/Judgement] : insight and judgment were intact

## 2022-10-24 NOTE — HISTORY OF PRESENT ILLNESS
[Home] : at home, [unfilled] , at the time of the visit. [Medical Office: ([facility])___] : at the medical office located in  [Verbal consent obtained from patient] : the patient, [unfilled] [FreeTextEntry8] : 61 yo  male s/p COVID-19  +ve home test x 2 yesterday 10/23/22 \par Symptom onset 10/22/22 \par "cold" like symptoms \par +ve sore throat \par mild H/A\par +ve nasal congestion \par no f/c/s \par no SOB \par mild nocturnal cough c intermittent clear productivity  \par no N/V/D no abd pain \par +ve good appetite \par sense of smell/taste intact \par +ve disruption of sleep    \par Denies chest pain/tightness/discomfort   \par +ve  sick contact  \par  \par \par

## 2022-11-03 NOTE — ED PROVIDER NOTE - ENMT, MLM
Dr. Holcomb spoke with provider. [facility] will be following patient.  
Mira arias/[facility] states she is returning a call    Please call Mira [phone]  
Per patient he did not go to appt with ID today due to having surgery. [facility] is asking if he is done with IV abx and and if PICC can be pulled. ID provider patient was supposed to follow up with will not provide recommendations because they have not seen the patient since 10/5. Advised pharmacy that we have not been monitoring labs as [facility]     Will discuss with Dr. Holcomb and let Mira know.  
Spoke with Dr. Holcomb and requested to speak directly with Dr. Tavera. [facility] and gave message to PSR to call Dr. Holcomb directly regarding patient.  
Airway patent, Nasal mucosa clear. Mouth with normal mucosa. Throat has no vesicles, no oropharyngeal exudates and uvula is midline.

## 2022-11-07 ENCOUNTER — APPOINTMENT (OUTPATIENT)
Dept: FAMILY MEDICINE | Facility: CLINIC | Age: 62
End: 2022-11-07

## 2022-11-07 VITALS
WEIGHT: 212 LBS | HEART RATE: 68 BPM | DIASTOLIC BLOOD PRESSURE: 80 MMHG | SYSTOLIC BLOOD PRESSURE: 110 MMHG | HEIGHT: 67 IN | TEMPERATURE: 98 F | BODY MASS INDEX: 33.27 KG/M2 | OXYGEN SATURATION: 99 %

## 2022-11-07 VITALS — DIASTOLIC BLOOD PRESSURE: 65 MMHG | SYSTOLIC BLOOD PRESSURE: 92 MMHG

## 2022-11-07 PROCEDURE — 99214 OFFICE O/P EST MOD 30 MIN: CPT | Mod: 25

## 2022-11-07 PROCEDURE — 36415 COLL VENOUS BLD VENIPUNCTURE: CPT

## 2022-11-07 RX ORDER — FLUOROURACIL 50 MG/G
5 CREAM TOPICAL
Qty: 40 | Refills: 0 | Status: COMPLETED | COMMUNITY
Start: 2022-09-12

## 2022-11-07 NOTE — PLAN
[FreeTextEntry1] : see lab orders \par \par cont statin therapy \par cont D/E \par \par cont PPI qod

## 2022-11-07 NOTE — HISTORY OF PRESENT ILLNESS
[FreeTextEntry1] : NICANOR is a 62 year male here for follow up HLD\par \par  [de-identified] : 63 yo male for f/u on HLD /Pre-Diabetes/Increased BMI \par s/p COVID-19 10/23/22   pt denies residual symptoms \par \par Denies f/c/s no cough sense of smell/taste intact \par \par as above +ve FAST no CP/SOB c activity no dizziness no  palpitations no N/V/D +BM qd NL no bloody/black stools \par no acute change in bowel  habits no urinary complaints

## 2022-11-07 NOTE — PHYSICAL EXAM
[No Acute Distress] : no acute distress [Well Nourished] : well nourished [Well Developed] : well developed [Well-Appearing] : well-appearing [PERRL] : pupils equal round and reactive to light [EOMI] : extraocular movements intact [Normal Outer Ear/Nose] : the outer ears and nose were normal in appearance [No JVD] : no jugular venous distention [No Respiratory Distress] : no respiratory distress  [No Accessory Muscle Use] : no accessory muscle use [Clear to Auscultation] : lungs were clear to auscultation bilaterally [Normal Rate] : normal rate  [Regular Rhythm] : with a regular rhythm [Normal S1, S2] : normal S1 and S2 [No Carotid Bruits] : no carotid bruits [No Edema] : there was no peripheral edema [No Palpable Aorta] : no palpable aorta [Non-distended] : non-distended [No CVA Tenderness] : no CVA  tenderness [Grossly Normal Strength/Tone] : grossly normal strength/tone [No Rash] : no rash [Coordination Grossly Intact] : coordination grossly intact [No Focal Deficits] : no focal deficits [Normal Gait] : normal gait [Normal Affect] : the affect was normal [Normal Mood] : the mood was normal [Normal Insight/Judgement] : insight and judgment were intact

## 2022-11-11 LAB
ALBUMIN SERPL ELPH-MCNC: 4.3 G/DL
ALP BLD-CCNC: 98 U/L
ALT SERPL-CCNC: 26 U/L
ANION GAP SERPL CALC-SCNC: 9 MMOL/L
AST SERPL-CCNC: 15 U/L
BILIRUB SERPL-MCNC: 0.4 MG/DL
BUN SERPL-MCNC: 13 MG/DL
CALCIUM SERPL-MCNC: 9.3 MG/DL
CHLORIDE SERPL-SCNC: 106 MMOL/L
CHOLEST SERPL-MCNC: 172 MG/DL
CK SERPL-CCNC: 59 U/L
CO2 SERPL-SCNC: 26 MMOL/L
CREAT SERPL-MCNC: 1.08 MG/DL
EGFR: 77 ML/MIN/1.73M2
ESTIMATED AVERAGE GLUCOSE: 123 MG/DL
GLUCOSE SERPL-MCNC: 118 MG/DL
HBA1C MFR BLD HPLC: 5.9 %
HDLC SERPL-MCNC: 51 MG/DL
LDLC SERPL CALC-MCNC: 108 MG/DL
NONHDLC SERPL-MCNC: 122 MG/DL
POTASSIUM SERPL-SCNC: 4.4 MMOL/L
PROT SERPL-MCNC: 6.8 G/DL
SODIUM SERPL-SCNC: 141 MMOL/L
TRIGL SERPL-MCNC: 69 MG/DL

## 2023-02-20 ENCOUNTER — RX RENEWAL (OUTPATIENT)
Age: 63
End: 2023-02-20

## 2023-09-14 ENCOUNTER — RX RENEWAL (OUTPATIENT)
Age: 63
End: 2023-09-14

## 2023-10-11 ENCOUNTER — RX RENEWAL (OUTPATIENT)
Age: 63
End: 2023-10-11

## 2023-10-13 ENCOUNTER — APPOINTMENT (OUTPATIENT)
Dept: FAMILY MEDICINE | Facility: CLINIC | Age: 63
End: 2023-10-13
Payer: MEDICARE

## 2023-10-13 ENCOUNTER — NON-APPOINTMENT (OUTPATIENT)
Age: 63
End: 2023-10-13

## 2023-10-13 VITALS — SYSTOLIC BLOOD PRESSURE: 102 MMHG | DIASTOLIC BLOOD PRESSURE: 65 MMHG

## 2023-10-13 VITALS
WEIGHT: 206 LBS | BODY MASS INDEX: 32.33 KG/M2 | HEART RATE: 65 BPM | HEIGHT: 67 IN | SYSTOLIC BLOOD PRESSURE: 124 MMHG | OXYGEN SATURATION: 98 % | DIASTOLIC BLOOD PRESSURE: 72 MMHG

## 2023-10-13 DIAGNOSIS — N40.0 BENIGN PROSTATIC HYPERPLASIA WITHOUT LOWER URINARY TRACT SYMPMS: ICD-10-CM

## 2023-10-13 DIAGNOSIS — E67.3 HYPERVITAMINOSIS D: ICD-10-CM

## 2023-10-13 DIAGNOSIS — R53.83 OTHER FATIGUE: ICD-10-CM

## 2023-10-13 DIAGNOSIS — R73.9 HYPERGLYCEMIA, UNSPECIFIED: ICD-10-CM

## 2023-10-13 PROCEDURE — G0438: CPT

## 2023-10-13 PROCEDURE — G0439: CPT

## 2023-10-13 PROCEDURE — 36415 COLL VENOUS BLD VENIPUNCTURE: CPT

## 2023-10-13 PROCEDURE — 93000 ELECTROCARDIOGRAM COMPLETE: CPT

## 2023-10-13 PROCEDURE — G0402 INITIAL PREVENTIVE EXAM: CPT

## 2023-10-13 PROCEDURE — G0403: CPT

## 2023-10-13 RX ORDER — ALLOPURINOL 100 MG/1
100 TABLET ORAL
Qty: 180 | Refills: 3 | Status: ACTIVE | COMMUNITY
Start: 2023-10-13

## 2023-10-16 LAB
25(OH)D3 SERPL-MCNC: 76.5 NG/ML
ALBUMIN SERPL ELPH-MCNC: 4.4 G/DL
ALP BLD-CCNC: 98 U/L
ALT SERPL-CCNC: 25 U/L
ANION GAP SERPL CALC-SCNC: 11 MMOL/L
APPEARANCE: CLEAR
AST SERPL-CCNC: 17 U/L
BACTERIA: NEGATIVE /HPF
BASOPHILS # BLD AUTO: 0.06 K/UL
BASOPHILS NFR BLD AUTO: 0.9 %
BILIRUB SERPL-MCNC: 0.5 MG/DL
BILIRUBIN URINE: NEGATIVE
BLOOD URINE: NEGATIVE
BUN SERPL-MCNC: 20 MG/DL
CALCIUM SERPL-MCNC: 9.5 MG/DL
CAST: 0 /LPF
CHLORIDE SERPL-SCNC: 104 MMOL/L
CHOLEST SERPL-MCNC: 188 MG/DL
CK SERPL-CCNC: 70 U/L
CO2 SERPL-SCNC: 26 MMOL/L
COLOR: YELLOW
CREAT SERPL-MCNC: 1.15 MG/DL
EGFR: 71 ML/MIN/1.73M2
EOSINOPHIL # BLD AUTO: 0.08 K/UL
EOSINOPHIL NFR BLD AUTO: 1.2 %
EPITHELIAL CELLS: 0 /HPF
ESTIMATED AVERAGE GLUCOSE: 114 MG/DL
GGT SERPL-CCNC: 58 U/L
GLUCOSE QUALITATIVE U: NEGATIVE MG/DL
GLUCOSE SERPL-MCNC: 114 MG/DL
HBA1C MFR BLD HPLC: 5.6 %
HCT VFR BLD CALC: 48.8 %
HDLC SERPL-MCNC: 54 MG/DL
HGB BLD-MCNC: 16.8 G/DL
IMM GRANULOCYTES NFR BLD AUTO: 0.4 %
KETONES URINE: NEGATIVE MG/DL
LDLC SERPL CALC-MCNC: 116 MG/DL
LEUKOCYTE ESTERASE URINE: NEGATIVE
LYMPHOCYTES # BLD AUTO: 1.76 K/UL
LYMPHOCYTES NFR BLD AUTO: 26.1 %
MAN DIFF?: NORMAL
MCHC RBC-ENTMCNC: 30.5 PG
MCHC RBC-ENTMCNC: 34.4 GM/DL
MCV RBC AUTO: 88.6 FL
MICROSCOPIC-UA: NORMAL
MONOCYTES # BLD AUTO: 0.58 K/UL
MONOCYTES NFR BLD AUTO: 8.6 %
NEUTROPHILS # BLD AUTO: 4.23 K/UL
NEUTROPHILS NFR BLD AUTO: 62.8 %
NITRITE URINE: NEGATIVE
NONHDLC SERPL-MCNC: 134 MG/DL
PH URINE: 6.5
PLATELET # BLD AUTO: 175 K/UL
POTASSIUM SERPL-SCNC: 4.8 MMOL/L
PROT SERPL-MCNC: 7.3 G/DL
PROTEIN URINE: NEGATIVE MG/DL
RBC # BLD: 5.51 M/UL
RBC # FLD: 13.2 %
RED BLOOD CELLS URINE: 0 /HPF
SODIUM SERPL-SCNC: 141 MMOL/L
SPECIFIC GRAVITY URINE: 1.01
T3FREE SERPL-MCNC: 3.22 PG/ML
T4 FREE SERPL-MCNC: 1.2 NG/DL
TRIGL SERPL-MCNC: 98 MG/DL
TSH SERPL-ACNC: 2.17 UIU/ML
URATE SERPL-MCNC: 5.2 MG/DL
UROBILINOGEN URINE: 0.2 MG/DL
WBC # FLD AUTO: 6.74 K/UL
WHITE BLOOD CELLS URINE: 0 /HPF

## 2024-05-03 ENCOUNTER — APPOINTMENT (OUTPATIENT)
Dept: FAMILY MEDICINE | Facility: CLINIC | Age: 64
End: 2024-05-03
Payer: MEDICARE

## 2024-05-03 VITALS
TEMPERATURE: 97.8 F | HEART RATE: 68 BPM | WEIGHT: 208 LBS | SYSTOLIC BLOOD PRESSURE: 122 MMHG | OXYGEN SATURATION: 98 % | BODY MASS INDEX: 32.65 KG/M2 | HEIGHT: 67 IN | DIASTOLIC BLOOD PRESSURE: 80 MMHG

## 2024-05-03 DIAGNOSIS — K21.9 GASTRO-ESOPHAGEAL REFLUX DISEASE W/OUT ESOPHAGITIS: ICD-10-CM

## 2024-05-03 DIAGNOSIS — M10.9 GOUT, UNSPECIFIED: ICD-10-CM

## 2024-05-03 DIAGNOSIS — E78.5 HYPERLIPIDEMIA, UNSPECIFIED: ICD-10-CM

## 2024-05-03 DIAGNOSIS — G43.909 MIGRAINE, UNSPECIFIED, NOT INTRACTABLE, W/OUT STATUS MIGRAINOSUS: ICD-10-CM

## 2024-05-03 PROCEDURE — 99214 OFFICE O/P EST MOD 30 MIN: CPT

## 2024-05-03 PROCEDURE — 36415 COLL VENOUS BLD VENIPUNCTURE: CPT

## 2024-05-03 RX ORDER — BENZONATATE 200 MG/1
200 CAPSULE ORAL 3 TIMES DAILY
Qty: 30 | Refills: 0 | Status: COMPLETED | COMMUNITY
Start: 2022-10-24 | End: 2024-05-03

## 2024-05-07 PROBLEM — K21.9 GERD (GASTROESOPHAGEAL REFLUX DISEASE): Status: ACTIVE | Noted: 2017-07-14

## 2024-05-07 PROBLEM — G43.909 MIGRAINE: Status: ACTIVE | Noted: 2018-06-06

## 2024-05-07 PROBLEM — M10.9 GOUT: Status: ACTIVE | Noted: 2023-10-13

## 2024-05-07 NOTE — HISTORY OF PRESENT ILLNESS
[FreeTextEntry1] : follow up [de-identified] : 65 yo male for f/u on  Migraine H/A's/GERD/Gout/HLD   as above feels well today. NO complaints at this time  +ve FAST no CP/SOB c activity no dizziness no  palpitations no N/V/D +BM qd NL no bloody/black stools  no acute change in bowel  habits no urinary complaints

## 2024-05-07 NOTE — PHYSICAL EXAM
[No Acute Distress] : no acute distress [Well Nourished] : well nourished [Well Developed] : well developed [Well-Appearing] : well-appearing [PERRL] : pupils equal round and reactive to light [EOMI] : extraocular movements intact [Normal Outer Ear/Nose] : the outer ears and nose were normal in appearance [No JVD] : no jugular venous distention [No Respiratory Distress] : no respiratory distress  [No Accessory Muscle Use] : no accessory muscle use [Clear to Auscultation] : lungs were clear to auscultation bilaterally [Normal Rate] : normal rate  [Regular Rhythm] : with a regular rhythm [Normal S1, S2] : normal S1 and S2 [No Carotid Bruits] : no carotid bruits [No Abdominal Bruit] : a ~M bruit was not heard ~T in the abdomen [No Edema] : there was no peripheral edema [No Palpable Aorta] : no palpable aorta [No Extremity Clubbing/Cyanosis] : no extremity clubbing/cyanosis [Soft] : abdomen soft [Non Tender] : non-tender [Non-distended] : non-distended [No Masses] : no abdominal mass palpated [No HSM] : no HSM [Normal Bowel Sounds] : normal bowel sounds [No CVA Tenderness] : no CVA  tenderness [Grossly Normal Strength/Tone] : grossly normal strength/tone [No Rash] : no rash [Coordination Grossly Intact] : coordination grossly intact [No Focal Deficits] : no focal deficits [Normal Gait] : normal gait [Normal Affect] : the affect was normal [Normal Mood] : the mood was normal [Normal Insight/Judgement] : insight and judgment were intact

## 2024-05-08 LAB
ALBUMIN SERPL ELPH-MCNC: 4.3 G/DL
ALP BLD-CCNC: 88 U/L
ALT SERPL-CCNC: 20 U/L
ANION GAP SERPL CALC-SCNC: 9 MMOL/L
AST SERPL-CCNC: 15 U/L
BILIRUB SERPL-MCNC: 0.5 MG/DL
BUN SERPL-MCNC: 19 MG/DL
CALCIUM SERPL-MCNC: 9.4 MG/DL
CHLORIDE SERPL-SCNC: 101 MMOL/L
CHOLEST SERPL-MCNC: 187 MG/DL
CK SERPL-CCNC: 68 U/L
CO2 SERPL-SCNC: 26 MMOL/L
CREAT SERPL-MCNC: 1.2 MG/DL
EGFR: 67 ML/MIN/1.73M2
GLUCOSE SERPL-MCNC: 107 MG/DL
HDLC SERPL-MCNC: 49 MG/DL
LDLC SERPL CALC-MCNC: 118 MG/DL
NONHDLC SERPL-MCNC: 138 MG/DL
POTASSIUM SERPL-SCNC: 4.7 MMOL/L
PROT SERPL-MCNC: 7.1 G/DL
SODIUM SERPL-SCNC: 136 MMOL/L
TRIGL SERPL-MCNC: 111 MG/DL

## 2024-09-11 ENCOUNTER — INPATIENT (INPATIENT)
Facility: HOSPITAL | Age: 64
LOS: 1 days | Discharge: HOME | DRG: 440 | End: 2024-09-13
Attending: FAMILY MEDICINE | Admitting: INTERNAL MEDICINE
Payer: MEDICARE

## 2024-09-11 VITALS
DIASTOLIC BLOOD PRESSURE: 88 MMHG | OXYGEN SATURATION: 100 % | TEMPERATURE: 98 F | HEART RATE: 52 BPM | SYSTOLIC BLOOD PRESSURE: 140 MMHG | WEIGHT: 210.54 LBS | RESPIRATION RATE: 18 BRPM

## 2024-09-11 DIAGNOSIS — K85.90 ACUTE PANCREATITIS WITHOUT NECROSIS OR INFECTION, UNSPECIFIED: ICD-10-CM

## 2024-09-11 LAB
ADD ON TEST-SPECIMEN IN LAB: SIGNIFICANT CHANGE UP
ALBUMIN SERPL ELPH-MCNC: 3.8 G/DL — SIGNIFICANT CHANGE UP (ref 3.3–5)
ALP SERPL-CCNC: 94 U/L — SIGNIFICANT CHANGE UP (ref 40–120)
ALT FLD-CCNC: 35 U/L — SIGNIFICANT CHANGE UP (ref 12–78)
ANION GAP SERPL CALC-SCNC: 4 MMOL/L — LOW (ref 5–17)
APPEARANCE UR: CLEAR — SIGNIFICANT CHANGE UP
AST SERPL-CCNC: 23 U/L — SIGNIFICANT CHANGE UP (ref 15–37)
BASOPHILS # BLD AUTO: 0.06 K/UL — SIGNIFICANT CHANGE UP (ref 0–0.2)
BASOPHILS NFR BLD AUTO: 0.5 % — SIGNIFICANT CHANGE UP (ref 0–2)
BILIRUB SERPL-MCNC: 0.6 MG/DL — SIGNIFICANT CHANGE UP (ref 0.2–1.2)
BILIRUB UR-MCNC: NEGATIVE — SIGNIFICANT CHANGE UP
BUN SERPL-MCNC: 24 MG/DL — HIGH (ref 7–23)
CALCIUM SERPL-MCNC: 9.9 MG/DL — SIGNIFICANT CHANGE UP (ref 8.5–10.1)
CHLORIDE SERPL-SCNC: 107 MMOL/L — SIGNIFICANT CHANGE UP (ref 96–108)
CO2 SERPL-SCNC: 28 MMOL/L — SIGNIFICANT CHANGE UP (ref 22–31)
COLOR SPEC: YELLOW — SIGNIFICANT CHANGE UP
CREAT SERPL-MCNC: 1.18 MG/DL — SIGNIFICANT CHANGE UP (ref 0.5–1.3)
DIFF PNL FLD: NEGATIVE — SIGNIFICANT CHANGE UP
EGFR: 68 ML/MIN/1.73M2 — SIGNIFICANT CHANGE UP
EOSINOPHIL # BLD AUTO: 0.06 K/UL — SIGNIFICANT CHANGE UP (ref 0–0.5)
EOSINOPHIL NFR BLD AUTO: 0.5 % — SIGNIFICANT CHANGE UP (ref 0–6)
GLUCOSE SERPL-MCNC: 151 MG/DL — HIGH (ref 70–99)
GLUCOSE UR QL: NEGATIVE MG/DL — SIGNIFICANT CHANGE UP
HCT VFR BLD CALC: 50.1 % — HIGH (ref 39–50)
HGB BLD-MCNC: 17.1 G/DL — HIGH (ref 13–17)
IMM GRANULOCYTES NFR BLD AUTO: 0.6 % — SIGNIFICANT CHANGE UP (ref 0–0.9)
KETONES UR-MCNC: NEGATIVE MG/DL — SIGNIFICANT CHANGE UP
LEUKOCYTE ESTERASE UR-ACNC: NEGATIVE — SIGNIFICANT CHANGE UP
LIDOCAIN IGE QN: >5000 U/L — HIGH (ref 13–75)
LYMPHOCYTES # BLD AUTO: 1.49 K/UL — SIGNIFICANT CHANGE UP (ref 1–3.3)
LYMPHOCYTES # BLD AUTO: 12 % — LOW (ref 13–44)
MCHC RBC-ENTMCNC: 30 PG — SIGNIFICANT CHANGE UP (ref 27–34)
MCHC RBC-ENTMCNC: 34.1 GM/DL — SIGNIFICANT CHANGE UP (ref 32–36)
MCV RBC AUTO: 87.9 FL — SIGNIFICANT CHANGE UP (ref 80–100)
MONOCYTES # BLD AUTO: 0.65 K/UL — SIGNIFICANT CHANGE UP (ref 0–0.9)
MONOCYTES NFR BLD AUTO: 5.2 % — SIGNIFICANT CHANGE UP (ref 2–14)
NEUTROPHILS # BLD AUTO: 10.09 K/UL — HIGH (ref 1.8–7.4)
NEUTROPHILS NFR BLD AUTO: 81.2 % — HIGH (ref 43–77)
NITRITE UR-MCNC: NEGATIVE — SIGNIFICANT CHANGE UP
PH UR: 5.5 — SIGNIFICANT CHANGE UP (ref 5–8)
PLATELET # BLD AUTO: 187 K/UL — SIGNIFICANT CHANGE UP (ref 150–400)
POTASSIUM SERPL-MCNC: 4.6 MMOL/L — SIGNIFICANT CHANGE UP (ref 3.5–5.3)
POTASSIUM SERPL-SCNC: 4.6 MMOL/L — SIGNIFICANT CHANGE UP (ref 3.5–5.3)
PROT SERPL-MCNC: 7.9 GM/DL — SIGNIFICANT CHANGE UP (ref 6–8.3)
PROT UR-MCNC: NEGATIVE MG/DL — SIGNIFICANT CHANGE UP
RBC # BLD: 5.7 M/UL — SIGNIFICANT CHANGE UP (ref 4.2–5.8)
RBC # FLD: 12.7 % — SIGNIFICANT CHANGE UP (ref 10.3–14.5)
SODIUM SERPL-SCNC: 139 MMOL/L — SIGNIFICANT CHANGE UP (ref 135–145)
SP GR SPEC: 1.02 — SIGNIFICANT CHANGE UP (ref 1–1.03)
TRIGL SERPL-MCNC: 138 MG/DL — SIGNIFICANT CHANGE UP
UROBILINOGEN FLD QL: 0.2 MG/DL — SIGNIFICANT CHANGE UP (ref 0.2–1)
WBC # BLD: 12.42 K/UL — HIGH (ref 3.8–10.5)
WBC # FLD AUTO: 12.42 K/UL — HIGH (ref 3.8–10.5)

## 2024-09-11 PROCEDURE — 84145 PROCALCITONIN (PCT): CPT

## 2024-09-11 PROCEDURE — 74177 CT ABD & PELVIS W/CONTRAST: CPT | Mod: 26,MC

## 2024-09-11 PROCEDURE — 83690 ASSAY OF LIPASE: CPT

## 2024-09-11 PROCEDURE — 74183 MRI ABD W/O CNTR FLWD CNTR: CPT | Mod: 26

## 2024-09-11 PROCEDURE — 82150 ASSAY OF AMYLASE: CPT

## 2024-09-11 PROCEDURE — 84481 FREE ASSAY (FT-3): CPT

## 2024-09-11 PROCEDURE — 76705 ECHO EXAM OF ABDOMEN: CPT | Mod: 26

## 2024-09-11 PROCEDURE — 82607 VITAMIN B-12: CPT

## 2024-09-11 PROCEDURE — 84100 ASSAY OF PHOSPHORUS: CPT

## 2024-09-11 PROCEDURE — 85025 COMPLETE CBC W/AUTO DIFF WBC: CPT

## 2024-09-11 PROCEDURE — 80053 COMPREHEN METABOLIC PANEL: CPT

## 2024-09-11 PROCEDURE — 82248 BILIRUBIN DIRECT: CPT

## 2024-09-11 PROCEDURE — 84443 ASSAY THYROID STIM HORMONE: CPT

## 2024-09-11 PROCEDURE — 93010 ELECTROCARDIOGRAM REPORT: CPT

## 2024-09-11 PROCEDURE — 99285 EMERGENCY DEPT VISIT HI MDM: CPT | Mod: FS

## 2024-09-11 PROCEDURE — 83550 IRON BINDING TEST: CPT

## 2024-09-11 PROCEDURE — 83735 ASSAY OF MAGNESIUM: CPT

## 2024-09-11 PROCEDURE — 83036 HEMOGLOBIN GLYCOSYLATED A1C: CPT

## 2024-09-11 PROCEDURE — 82746 ASSAY OF FOLIC ACID SERUM: CPT

## 2024-09-11 PROCEDURE — 84484 ASSAY OF TROPONIN QUANT: CPT

## 2024-09-11 PROCEDURE — 82728 ASSAY OF FERRITIN: CPT

## 2024-09-11 PROCEDURE — A9579: CPT

## 2024-09-11 PROCEDURE — 82977 ASSAY OF GGT: CPT

## 2024-09-11 PROCEDURE — 83540 ASSAY OF IRON: CPT

## 2024-09-11 PROCEDURE — 36415 COLL VENOUS BLD VENIPUNCTURE: CPT

## 2024-09-11 PROCEDURE — 85027 COMPLETE CBC AUTOMATED: CPT

## 2024-09-11 PROCEDURE — 84439 ASSAY OF FREE THYROXINE: CPT

## 2024-09-11 PROCEDURE — 74183 MRI ABD W/O CNTR FLWD CNTR: CPT | Mod: MC

## 2024-09-11 PROCEDURE — 86140 C-REACTIVE PROTEIN: CPT

## 2024-09-11 PROCEDURE — 99223 1ST HOSP IP/OBS HIGH 75: CPT

## 2024-09-11 PROCEDURE — 85610 PROTHROMBIN TIME: CPT

## 2024-09-11 RX ORDER — FLU VACCINE TS 2012-2013(5YR+) 45MCG/.5ML
0.5 VIAL (ML) INTRAMUSCULAR ONCE
Refills: 0 | Status: COMPLETED | OUTPATIENT
Start: 2024-09-11 | End: 2024-09-11

## 2024-09-11 RX ORDER — ASPIRIN 81 MG
1 TABLET, DELAYED RELEASE (ENTERIC COATED) ORAL
Refills: 0 | DISCHARGE

## 2024-09-11 RX ORDER — ACETAMINOPHEN 325 MG/1
1000 TABLET ORAL ONCE
Refills: 0 | Status: COMPLETED | OUTPATIENT
Start: 2024-09-11 | End: 2024-09-11

## 2024-09-11 RX ORDER — SODIUM CHLORIDE 9 MG/ML
1000 INJECTION INTRAMUSCULAR; INTRAVENOUS; SUBCUTANEOUS
Refills: 0 | Status: DISCONTINUED | OUTPATIENT
Start: 2024-09-11 | End: 2024-09-12

## 2024-09-11 RX ORDER — HYDROMORPHONE HYDROCHLORIDE 2 MG/1
1 TABLET ORAL EVERY 4 HOURS
Refills: 0 | Status: DISCONTINUED | OUTPATIENT
Start: 2024-09-11 | End: 2024-09-13

## 2024-09-11 RX ORDER — ONDANSETRON 2 MG/ML
4 INJECTION, SOLUTION INTRAMUSCULAR; INTRAVENOUS EVERY 8 HOURS
Refills: 0 | Status: DISCONTINUED | OUTPATIENT
Start: 2024-09-11 | End: 2024-09-13

## 2024-09-11 RX ORDER — BISMUTH SUBSALICYLATE 525 MG/1
1 TABLET ORAL
Refills: 0 | DISCHARGE

## 2024-09-11 RX ORDER — ESOMEPRAZOLE MAGNESIUM 40 MG/1
1 CAPSULE, DELAYED RELEASE ORAL
Refills: 0 | DISCHARGE

## 2024-09-11 RX ORDER — MAGNESIUM, ALUMINUM HYDROXIDE 200-225/5
30 SUSPENSION, ORAL (FINAL DOSE FORM) ORAL EVERY 4 HOURS
Refills: 0 | Status: DISCONTINUED | OUTPATIENT
Start: 2024-09-11 | End: 2024-09-13

## 2024-09-11 RX ORDER — PANTOPRAZOLE SODIUM 40 MG
8 TABLET, DELAYED RELEASE (ENTERIC COATED) ORAL
Qty: 80 | Refills: 0 | Status: DISCONTINUED | OUTPATIENT
Start: 2024-09-11 | End: 2024-09-12

## 2024-09-11 RX ORDER — FAMOTIDINE 10 MG/ML
20 INJECTION INTRAVENOUS ONCE
Refills: 0 | Status: COMPLETED | OUTPATIENT
Start: 2024-09-11 | End: 2024-09-11

## 2024-09-11 RX ORDER — SODIUM CHLORIDE 9 MG/ML
1000 INJECTION INTRAMUSCULAR; INTRAVENOUS; SUBCUTANEOUS ONCE
Refills: 0 | Status: COMPLETED | OUTPATIENT
Start: 2024-09-11 | End: 2024-09-11

## 2024-09-11 RX ORDER — HEPARIN SODIUM,BOVINE 1000/ML
5000 VIAL (ML) INJECTION EVERY 8 HOURS
Refills: 0 | Status: DISCONTINUED | OUTPATIENT
Start: 2024-09-11 | End: 2024-09-13

## 2024-09-11 RX ORDER — ACETAMINOPHEN 325 MG/1
650 TABLET ORAL EVERY 6 HOURS
Refills: 0 | Status: DISCONTINUED | OUTPATIENT
Start: 2024-09-11 | End: 2024-09-13

## 2024-09-11 RX ORDER — ASPIRIN 81 MG
81 TABLET, DELAYED RELEASE (ENTERIC COATED) ORAL DAILY
Refills: 0 | Status: DISCONTINUED | OUTPATIENT
Start: 2024-09-11 | End: 2024-09-13

## 2024-09-11 RX ORDER — ONDANSETRON 2 MG/ML
4 INJECTION, SOLUTION INTRAMUSCULAR; INTRAVENOUS ONCE
Refills: 0 | Status: COMPLETED | OUTPATIENT
Start: 2024-09-11 | End: 2024-09-11

## 2024-09-11 RX ADMIN — SODIUM CHLORIDE 1000 MILLILITER(S): 9 INJECTION INTRAMUSCULAR; INTRAVENOUS; SUBCUTANEOUS at 10:34

## 2024-09-11 RX ADMIN — SODIUM CHLORIDE 150 MILLILITER(S): 9 INJECTION INTRAMUSCULAR; INTRAVENOUS; SUBCUTANEOUS at 14:38

## 2024-09-11 RX ADMIN — FAMOTIDINE 20 MILLIGRAM(S): 10 INJECTION INTRAVENOUS at 10:34

## 2024-09-11 RX ADMIN — ACETAMINOPHEN 650 MILLIGRAM(S): 325 TABLET ORAL at 22:46

## 2024-09-11 RX ADMIN — ACETAMINOPHEN 650 MILLIGRAM(S): 325 TABLET ORAL at 23:46

## 2024-09-11 RX ADMIN — SODIUM CHLORIDE 150 MILLILITER(S): 9 INJECTION INTRAMUSCULAR; INTRAVENOUS; SUBCUTANEOUS at 22:44

## 2024-09-11 RX ADMIN — Medication 10 MG/HR: at 16:38

## 2024-09-11 RX ADMIN — HYDROMORPHONE HYDROCHLORIDE 1 MILLIGRAM(S): 2 TABLET ORAL at 20:08

## 2024-09-11 RX ADMIN — Medication 2 MILLIGRAM(S): at 12:41

## 2024-09-11 RX ADMIN — ONDANSETRON 4 MILLIGRAM(S): 2 INJECTION, SOLUTION INTRAMUSCULAR; INTRAVENOUS at 12:41

## 2024-09-11 RX ADMIN — HYDROMORPHONE HYDROCHLORIDE 1 MILLIGRAM(S): 2 TABLET ORAL at 20:25

## 2024-09-11 RX ADMIN — HYDROMORPHONE HYDROCHLORIDE 1 MILLIGRAM(S): 2 TABLET ORAL at 14:45

## 2024-09-11 RX ADMIN — HYDROMORPHONE HYDROCHLORIDE 1 MILLIGRAM(S): 2 TABLET ORAL at 15:19

## 2024-09-11 RX ADMIN — ACETAMINOPHEN 400 MILLIGRAM(S): 325 TABLET ORAL at 10:33

## 2024-09-11 NOTE — ED ADULT TRIAGE NOTE - CHIEF COMPLAINT QUOTE
PT presents to er with complaints of epigastric pain, onset this morning with chills, denies emesis/diarrhea, fevers, denies medical history.

## 2024-09-11 NOTE — ED STATDOCS - ATTENDING APP SHARED VISIT CONTRIBUTION OF CARE
I,Ermias Macario MD,  performed the initial face to face bedside interview with this patient regarding history of present illness, review of symptoms and relevant past medical, social and family history.  I completed an independent physical examination.  I was the initial provider who evaluated this patient. I have signed out the follow up of any pending tests (i.e. labs, radiological studies) to the ACP.  I have communicated the patient’s plan of care and disposition with the ACP.  The history, relevant review of systems, past medical and surgical history, medical decision making, and physical examination was documented by the scribe in my presence and I attest to the accuracy of the documentation.

## 2024-09-11 NOTE — PATIENT PROFILE ADULT - FUNCTIONAL ASSESSMENT - BASIC MOBILITY 4.
4 = No assist / stand by assistance [facility] DIVISION OF KIDNEY DISEASES AND HYPERTENSION -- FOLLOW UP NOTE  --------------------------------------------------------------------------------  57-year-old female with no PMHx admitted for hypoxic respiratory failure. Nephrology consulted for SAM. Pt. was seen and examined at bedside. Found to have confirmed COVID PNA, transferred to MICU for intubation on 3/20/2020. Pt. with no previous history of kidney disease. On lab review of [facility] SCr WNL (0.82) on 3/28/18. Scr stable to 0.84 during current hospitalization (3/15/2020). Scr elevated to 2.61  (3/21/2020), today 4.1 non oliguric    24 hour events/subjective:  Pt. was seen and examined at bedside, intubated, on IV pressor support, sedated. FiO2 40%, PEEP 12. On IV bumex 3 mg/hr, UOP in 24 hours 1.7, currently 1.8 lit positive in last 24 hours.       PAST HISTORY  --------------------------------------------------------------------------------  No significant changes to PMH, PSH, FHx, SHx, unless otherwise noted    ALLERGIES & MEDICATIONS  --------------------------------------------------------------------------------  Allergies    No Known Allergies    Intolerances      Standing Inpatient Medications  ascorbic acid IVPB 1500 milliGRAM(s) IV Intermittent every 6 hours  azithromycin   Tablet 500 milliGRAM(s) Oral daily  buMETAnide Infusion 3 mG/Hr IV Continuous <Continuous>  chlorhexidine 0.12% Liquid 15 milliLiter(s) Oral Mucosa every 12 hours  chlorhexidine 4% Liquid 1 Application(s) Topical <User Schedule>  cisatracurium Infusion 2 MICROgram(s)/kG/Min IV Continuous <Continuous>  famotidine Injectable 20 milliGRAM(s) IV Push daily  fentaNYL   Infusion. 0.5 MICROgram(s)/kG/Hr IV Continuous <Continuous>  heparin  Injectable 5000 Unit(s) SubCutaneous every 8 hours  hydroxychloroquine 200 milliGRAM(s) Oral every 12 hours  metolazone 10 milliGRAM(s) Oral daily  midazolam Infusion 0.02 mG/kG/Hr IV Continuous <Continuous>  norepinephrine Infusion 0.05 MICROgram(s)/kG/Min IV Continuous <Continuous>  polyethylene glycol 3350 17 Gram(s) Oral daily  propofol Infusion 20 MICROgram(s)/kG/Min IV Continuous <Continuous>    PRN Inpatient Medications  acetaminophen    Suspension .. 650 milliGRAM(s) Oral every 6 hours PRN  guaiFENesin   Syrup  (Sugar-Free) 100 milliGRAM(s) Oral every 6 hours PRN  sodium chloride 0.9% lock flush 10 milliLiter(s) IV Push every 1 hour PRN      REVIEW OF SYSTEMS  --------------------------------------------------------------------------------  not able to obtain.     VITALS/PHYSICAL EXAM  --------------------------------------------------------------------------------  T(C): 37.8 (03-23-20 @ 08:00), Max: 37.8 (03-23-20 @ 08:00)  HR: 115 (03-23-20 @ 09:00) (95 - 115)  BP: 123/67 (03-23-20 @ 08:00) (101/53 - 123/67)  RR: 30 (03-23-20 @ 09:00) (0 - 30)  SpO2: 95% (03-23-20 @ 09:00) (89% - 97%)  Wt(kg): --        03-22-20 @ 07:01  -  03-23-20 @ 07:00  --------------------------------------------------------  IN: 3578.6 mL / OUT: 1705 mL / NET: 1873.6 mL    03-23-20 @ 07:01  -  03-23-20 @ 09:24  --------------------------------------------------------  IN: 321.8 mL / OUT: 200 mL / NET: 121.8 mL      Physical Exam:    	Gen: Intubated  	HEENT: +ETT  	Pulm: Coarse breath sounds B/L  	CV: S1S2  	Abd: Soft, +BS   	Ext: No LE edema B/L  	Neuro: Sedated  	Skin: Warm and dry    LABS/STUDIES  --------------------------------------------------------------------------------              8.2    12.57 >-----------<  356      [03-23-20 @ 04:00]              25.2     133  |  100  |  24  ----------------------------<  118      [03-22-20 @ 21:20]  4.1   |  18  |  4.10        Ca     8.8     [03-22-20 @ 21:20]      Mg     2.7     [03-22-20 @ 21:20]      Phos  4.6     [03-22-20 @ 21:20]    TPro  5.9  /  Alb  2.8  /  TBili  0.4  /  DBili  x   /  AST  21  /  ALT  32  /  AlkPhos  96  [03-22-20 @ 21:20]    PT/INR: PT 11.6 , INR 1.01       [03-23-20 @ 04:00]  PTT: 31.7       [03-23-20 @ 04:00]          [03-22-20 @ 03:39]        [03-23-20 @ 02:26]    Creatinine Trend:  SCr 4.10 [03-22 @ 21:20]  SCr 3.40 [03-22 @ 03:39]  SCr 3.07 [03-21 @ 20:42]  SCr 2.67 [03-21 @ 06:35]  SCr 0.49 [03-20 @ 04:30]    Urinalysis - [03-15-20 @ 22:27]      Color YELLOW / Appearance CLEAR / SG 1.037 / pH 6.0      Gluc NEGATIVE / Ketone TRACE  / Bili NEGATIVE / Urobili TRACE       Blood MODERATE / Protein 200 / Leuk Est NEGATIVE / Nitrite NEGATIVE      RBC 11-25 / WBC 3-5 / Hyaline 1+ / Gran  / Sq Epi FEW / Non Sq Epi  / Bacteria NEGATIVE      Ferritin 1199      [03-23-20 @ 04:00]  Lipid: chol --, , HDL --, LDL --      [03-22-20 @ 03:39]    HCV 0.12, Nonreactive Hepatitis C AB  S/CO Ratio                        Interpretation  < 1.00                                   Non-Reactive  1.00 - 4.99                         Weakly-Reactive  >= 5.00                                Reactive  Non-Reactive: Aperson with a non-reactive HCV antibody  result is considered uninfected.  No further action is  needed unless recent infection is suspected.  In these  cases, consider repeat testing later to detect  seroconversion..  Weakly-Reactive: HCV antibody test is abnormal, HCV RNA  Qualitative test will follow.  Reactive: HCV antibody test is abnormal, HCV RNA  Qualitative test will follow.  Note: HCV antibody testing is performed on the [facility] system.      [03-16-20 @ 05:30] [facility] DIVISION OF KIDNEY DISEASES AND HYPERTENSION -- FOLLOW UP NOTE  --------------------------------------------------------------------------------  57-year-old female with no PMH admitted to ICU for respiratory failure, pneumonia, SAM and COVID-19. [facility]0.82[facility] 3/[facility]/18, increased to 4.1 today.     Pt. was seen and examined at bedside, intubated, on IV pressor support, sedated. FiO2 40%, PEEP 12. On IV Bumex 3 mg/hr, UOP: 1.7 L in 24 hours.       PAST HISTORY  --------------------------------------------------------------------------------  No significant changes to PMH, PSH, FHx, SHx, unless otherwise noted    ALLERGIES & MEDICATIONS  --------------------------------------------------------------------------------  Allergies    No Known Allergies    Intolerances    Standing Inpatient Medications  ascorbic acid IVPB 1500 milliGRAM(s) IV Intermittent every 6 hours  azithromycin   Tablet 500 milliGRAM(s) Oral daily  buMETAnide Infusion 3 mG/Hr IV Continuous <Continuous>  chlorhexidine 0.12% Liquid 15 milliLiter(s) Oral Mucosa every 12 hours  chlorhexidine 4% Liquid 1 Application(s) Topical <User Schedule>  cisatracurium Infusion 2 MICROgram(s)/kG/Min IV Continuous <Continuous>  famotidine Injectable 20 milliGRAM(s) IV Push daily  fentaNYL   Infusion. 0.5 MICROgram(s)/kG/Hr IV Continuous <Continuous>  heparin  Injectable 5000 Unit(s) SubCutaneous every 8 hours  hydroxychloroquine 200 milliGRAM(s) Oral every 12 hours  metolazone 10 milliGRAM(s) Oral daily  midazolam Infusion 0.02 mG/kG/Hr IV Continuous <Continuous>  norepinephrine Infusion 0.05 MICROgram(s)/kG/Min IV Continuous <Continuous>  polyethylene glycol 3350 17 Gram(s) Oral daily  propofol Infusion 20 MICROgram(s)/kG/Min IV Continuous <Continuous>    PRN Inpatient Medications  acetaminophen    Suspension .. 650 milliGRAM(s) Oral every 6 hours PRN  guaiFENesin   Syrup  (Sugar-Free) 100 milliGRAM(s) Oral every 6 hours PRN  sodium chloride 0.9% lock flush 10 milliLiter(s) IV Push every 1 hour PRN    REVIEW OF SYSTEMS  --------------------------------------------------------------------------------  Unable to obtain.    VITALS/PHYSICAL EXAM  --------------------------------------------------------------------------------  T(C): 37.8 (03-23-20 @ 08:00), Max: 37.8 (03-23-20 @ 08:00)  HR: 115 (03-23-20 @ 09:00) (95 - 115)  BP: 123/67 (03-23-20 @ 08:00) (101/53 - 123/67)  RR: 30 (03-23-20 @ 09:00) (0 - 30)  SpO2: 95% (03-23-20 @ 09:00) (89% - 97%)  Wt(kg): --    03-22-20 @ 07:01  -  03-23-20 @ 07:00  --------------------------------------------------------  IN: 3578.6 mL / OUT: 1705 mL / NET: 1873.6 mL    03-23-20 @ 07:01  -  03-23-20 @ 09:24  --------------------------------------------------------  IN: 321.8 mL / OUT: 200 mL / NET: 121.8 mL    Physical Exam (limited):  	Gen: Intubated  	HEENT: +ETT  	Pulm: Coarse breath sounds B/L  	CV: S1S2+  	Abd: Soft, +BS   	Ext: No LE edema B/L  	Neuro: Sedated  	Skin: Warm and dry    LABS/STUDIES  --------------------------------------------------------------------------------              8.2    12.57 >-----------<  356      [03-23-20 @ 04:00]              25.2     133  |  100  |  24  ----------------------------<  118      [03-22-20 @ 21:20]  4.1   |  18  |  4.10        Ca     8.8     [03-22-20 @ 21:20]      Mg     2.7     [03-22-20 @ 21:20]      Phos  4.6     [03-22-20 @ 21:20]    TPro  5.9  /  Alb  2.8  /  TBili  0.4  /  DBili  x   /  AST  21  /  ALT  32  /  AlkPhos  96  [03-22-20 @ 21:20]          [03-22-20 @ 03:39]        [03-23-20 @ 02:26]    Creatinine Trend:  SCr 4.10 [03-22 @ 21:20]  SCr 3.40 [03-22 @ 03:39]  SCr 3.07 [03-21 @ 20:42]  SCr 2.67 [03-21 @ 06:35]  SCr 0.49 [03-20 @ 04:30]    Urinalysis - [03-15-20 @ 22:27]      Color YELLOW / Appearance CLEAR / SG 1.037 / pH 6.0      Gluc NEGATIVE / Ketone TRACE  / Bili NEGATIVE / Urobili TRACE       Blood MODERATE / Protein 200 / Leuk Est NEGATIVE / Nitrite NEGATIVE      RBC 11-25 / WBC 3-5 / Hyaline 1+ / Gran  / Sq Epi FEW / Non Sq Epi  / Bacteria NEGATIVE    Ferritin 1199      [03-23-20 @ 04:00]    HCV 0.12, Nonreactive Hepatitis C AB  S/CO Ratio                        Interpretation  < 1.00                                   Non-Reactive  1.00 - 4.99                         Weakly-Reactive  >= 5.00                                Reactive  Non-Reactive: Aperson with a non-reactive HCV antibody  result is considered uninfected.  No further action is  needed unless recent infection is suspected.  In these  cases, consider repeat testing later to detect  seroconversion..  Weakly-Reactive: HCV antibody test is abnormal, HCV RNA  Qualitative test will follow.  Reactive: HCV antibody test is abnormal, HCV RNA  Qualitative test will follow.  Note: HCV antibody testing is performed on the [facility] system.      [03-16-20 @ 05:30]

## 2024-09-11 NOTE — H&P ADULT - NSHPLABSRESULTS_GEN_ALL_CORE
17.1   12.42 )-----------( 187      ( 11 Sep 2024 10:37 )             50.1   09-11    139  |  107  |  24<H>  ----------------------------<  151<H>  4.6   |  28  |  1.18    Ca    9.9      11 Sep 2024 10:37    TPro  7.9  /  Alb  3.8  /  TBili  0.6  /  DBili  x   /  AST  23  /  ALT  35  /  AlkPhos  94  09-11      ct< from: CT Abdomen and Pelvis w/ IV Cont (09.11.24 @ 11:51) >    BOWEL: No bowel obstruction. Appendix is normal.  PERITONEUM/RETROPERITONEUM: Trace perihepatic ascites  VESSELS: Patent hepatic, portal, splenic and superior mesenteric veins.   Unremarkable celiac and superior mesenteric arteries.  LYMPH NODES: No lymphadenopathy.  ABDOMINAL WALL: Within normal limits.  BONES: Within normal limits.    IMPRESSION:  Moderately severe acute interstitial/edematous pancreatitis.

## 2024-09-11 NOTE — ED ADULT NURSE NOTE - PAIN: PRESENCE, MLM
complains of pain/discomfort other (specify) Nutrition focused physical exam conducted - found signs of malnutrition [ ]absent [X]present   Subcutaneous fat loss: [SEVERE] Orbital fat pads region, [SEVERE]Buccal fat region, [SEVERE]Triceps region    Muscle wasting: [MODERATE] Temples region, [SEVERE] Clavicle region, [SEVERE]Shoulder region

## 2024-09-11 NOTE — ED ADULT NURSE NOTE - OBJECTIVE STATEMENT
Epigastric pain /upper abdominal pain started at 0745 no nausea, no vomiting , no diarrhea no fever.  Patient states the pain caused him to break out in a cold sweat.  Pain decreased on it's own to 5/10 prior to arrival to ED.   Hx hyperlipidemia, GERD

## 2024-09-11 NOTE — ED STATDOCS - OBJECTIVE STATEMENT
63 y/o male with past medical history of  high cholesterol presents with upper abdominal pain since this morning following a protein shake. Pt says pain went away then returned. Pt denies nausea/vomiting, and had a normal bowel movement this morning, denies fever or urinary sx, never smoker, social drinker, No other complaints at this time.

## 2024-09-11 NOTE — PATIENT PROFILE ADULT - STATED REASON FOR ADMISSION
"I woke up, tried to eat yogurt and drink my protein shake then afterwards had very bad abdominal pain that would not go away"

## 2024-09-11 NOTE — ED ADULT NURSE NOTE - CHPI ED NUR SYMPTOMS NEG
no abdominal distension/no blood in stool/no burning urination/no diarrhea/no hematuria/no nausea/no vomiting

## 2024-09-11 NOTE — PATIENT PROFILE ADULT - FALL HARM RISK - UNIVERSAL INTERVENTIONS
Bed in lowest position, wheels locked, appropriate side rails in place/Call bell, personal items and telephone in reach/Instruct patient to call for assistance before getting out of bed or chair/Non-slip footwear when patient is out of bed/[place] to call system/Physically safe environment - no spills, clutter or unnecessary equipment/Purposeful Proactive Rounding/Room/bathroom lighting operational, light cord in reach

## 2024-09-11 NOTE — H&P ADULT - ASSESSMENT
* Acute Pancreatitis  NPO  MRCP  PRN analgesia  IVF  send lipid profile   consult surgery    I held all homme meds: allopurinol, asa, crestor

## 2024-09-11 NOTE — INPATIENT CERTIFICATION FOR MEDICARE PATIENTS - NS ICMP CERT SIG IND
I certify as stated above. Methotrexate Counseling:  Patient counseled regarding adverse effects of methotrexate including but not limited to nausea, vomiting, abnormalities in liver function tests. Patients may develop mouth sores, rash, diarrhea, and abnormalities in blood counts. The patient understands that monitoring is required including LFT's and blood counts.  There is a rare possibility of scarring of the liver and lung problems that can occur when taking methotrexate. Persistent nausea, loss of appetite, pale stools, dark urine, cough, and shortness of breath should be reported immediately. Patient advised to discontinue methotrexate treatment at least three months before attempting to become pregnant.  I discussed the need for folate supplements while taking methotrexate.  These supplements can decrease side effects during methotrexate treatment. The patient verbalized understanding of the proper use and possible adverse effects of methotrexate.  All of the patient's questions and concerns were addressed.

## 2024-09-11 NOTE — H&P ADULT - NSHPPHYSICALEXAM_GEN_ALL_CORE
Vital Signs Last 24 Hrs  T(C): 36.8 (11 Sep 2024 10:07), Max: 36.8 (11 Sep 2024 10:07)  T(F): 98.2 (11 Sep 2024 10:07), Max: 98.2 (11 Sep 2024 10:07)  HR: 52 (11 Sep 2024 10:07) (52 - 52)  BP: 140/88 (11 Sep 2024 10:07) (140/88 - 140/88)  BP(mean): 101 (11 Sep 2024 10:07) (101 - 101)  RR: 18 (11 Sep 2024 10:07) (18 - 18)  SpO2: 100% (11 Sep 2024 10:07) (100% - 100%)    Parameters below as of 11 Sep 2024 10:07  Patient On (Oxygen Delivery Method): room air    PHYSICAL EXAM:      Constitutional: NAD  Eyes: perrl, no conjunctival changes  ENMT: no exudates, moist oral muc, uvula midline  Neck: no JVD, no LAD  Back: no cva tenderness  Respiratory: CTA, no exp wheezes  Cardiovascular: S1S2 reg, no murmur gallop or rub  Gastrointestinal: abd soft, mild epig tenderness  Genitourinary: voiding  Extremities: FROM, no joint effusions, no edema, no clubbing , no cyanosis  Vascular: pedal pulses + bilateral, warm extremities  Neurological: non focal, mot str 5/5/ all extr  Skin: no rashes  Lymph Nodes: no LAD

## 2024-09-11 NOTE — ED STATDOCS - CLINICAL SUMMARY MEDICAL DECISION MAKING FREE TEXT BOX
65 y/o male with past medical history of high cholesterol presents with upper abdominal pain, will obtain labs, sonogram for possible gastritis, pancreatitis or gallbladder disease. Pt does not have lower abdominal pain or tenderness.

## 2024-09-11 NOTE — ED STATDOCS - PROGRESS NOTE DETAILS
labs with lipase >5000, mild leukocytosis, other labs WNL, will add triglycerides, CT, pending RUQ US  Diana Beltran PA-C Patient seen and evaluated, ED attending note and orders reviewed, will continue with patient follow up and care -Diana Beltran PA-C CT a/p with Moderately severe acute interstitial/edematous pancreatitis, will admit to medicine for further management  Diana Beltran PA-C

## 2024-09-12 LAB
A1C WITH ESTIMATED AVERAGE GLUCOSE RESULT: 5.7 % — HIGH (ref 4–5.6)
ADD ON TEST-SPECIMEN IN LAB: SIGNIFICANT CHANGE UP
ALBUMIN SERPL ELPH-MCNC: 3 G/DL — LOW (ref 3.3–5)
ALP SERPL-CCNC: 82 U/L — SIGNIFICANT CHANGE UP (ref 40–120)
ALT FLD-CCNC: 28 U/L — SIGNIFICANT CHANGE UP (ref 12–78)
AMYLASE P1 CFR SERPL: 724 U/L — HIGH (ref 25–115)
ANION GAP SERPL CALC-SCNC: 3 MMOL/L — LOW (ref 5–17)
AST SERPL-CCNC: 12 U/L — LOW (ref 15–37)
BASOPHILS # BLD AUTO: 0.03 K/UL — SIGNIFICANT CHANGE UP (ref 0–0.2)
BASOPHILS NFR BLD AUTO: 0.3 % — SIGNIFICANT CHANGE UP (ref 0–2)
BILIRUB SERPL-MCNC: 0.8 MG/DL — SIGNIFICANT CHANGE UP (ref 0.2–1.2)
BUN SERPL-MCNC: 14 MG/DL — SIGNIFICANT CHANGE UP (ref 7–23)
CALCIUM SERPL-MCNC: 8.8 MG/DL — SIGNIFICANT CHANGE UP (ref 8.5–10.1)
CHLORIDE SERPL-SCNC: 108 MMOL/L — SIGNIFICANT CHANGE UP (ref 96–108)
CHOLEST SERPL-MCNC: 199 MG/DL — SIGNIFICANT CHANGE UP
CO2 SERPL-SCNC: 26 MMOL/L — SIGNIFICANT CHANGE UP (ref 22–31)
CREAT SERPL-MCNC: 0.98 MG/DL — SIGNIFICANT CHANGE UP (ref 0.5–1.3)
CRP SERPL-MCNC: 45 MG/L — HIGH
EGFR: 85 ML/MIN/1.73M2 — SIGNIFICANT CHANGE UP
EOSINOPHIL # BLD AUTO: 0.04 K/UL — SIGNIFICANT CHANGE UP (ref 0–0.5)
EOSINOPHIL NFR BLD AUTO: 0.4 % — SIGNIFICANT CHANGE UP (ref 0–6)
ESTIMATED AVERAGE GLUCOSE: 117 MG/DL — HIGH (ref 68–114)
GLUCOSE SERPL-MCNC: 101 MG/DL — HIGH (ref 70–99)
HCT VFR BLD CALC: 45.3 % — SIGNIFICANT CHANGE UP (ref 39–50)
HDLC SERPL-MCNC: 58 MG/DL — SIGNIFICANT CHANGE UP
HGB BLD-MCNC: 15.1 G/DL — SIGNIFICANT CHANGE UP (ref 13–17)
IMM GRANULOCYTES NFR BLD AUTO: 0.5 % — SIGNIFICANT CHANGE UP (ref 0–0.9)
LIDOCAIN IGE QN: 1235 U/L — HIGH (ref 13–75)
LIPID PNL WITH DIRECT LDL SERPL: 117 MG/DL — HIGH
LYMPHOCYTES # BLD AUTO: 1.23 K/UL — SIGNIFICANT CHANGE UP (ref 1–3.3)
LYMPHOCYTES # BLD AUTO: 10.8 % — LOW (ref 13–44)
MAGNESIUM SERPL-MCNC: 1.9 MG/DL — SIGNIFICANT CHANGE UP (ref 1.6–2.6)
MCHC RBC-ENTMCNC: 29.4 PG — SIGNIFICANT CHANGE UP (ref 27–34)
MCHC RBC-ENTMCNC: 33.3 GM/DL — SIGNIFICANT CHANGE UP (ref 32–36)
MCV RBC AUTO: 88.1 FL — SIGNIFICANT CHANGE UP (ref 80–100)
MONOCYTES # BLD AUTO: 0.74 K/UL — SIGNIFICANT CHANGE UP (ref 0–0.9)
MONOCYTES NFR BLD AUTO: 6.5 % — SIGNIFICANT CHANGE UP (ref 2–14)
NEUTROPHILS # BLD AUTO: 9.24 K/UL — HIGH (ref 1.8–7.4)
NEUTROPHILS NFR BLD AUTO: 81.5 % — HIGH (ref 43–77)
NON HDL CHOLESTEROL: 141 MG/DL — HIGH
PHOSPHATE SERPL-MCNC: 2.7 MG/DL — SIGNIFICANT CHANGE UP (ref 2.5–4.5)
PLATELET # BLD AUTO: 163 K/UL — SIGNIFICANT CHANGE UP (ref 150–400)
POTASSIUM SERPL-MCNC: 4.1 MMOL/L — SIGNIFICANT CHANGE UP (ref 3.5–5.3)
POTASSIUM SERPL-SCNC: 4.1 MMOL/L — SIGNIFICANT CHANGE UP (ref 3.5–5.3)
PROT SERPL-MCNC: 6.7 GM/DL — SIGNIFICANT CHANGE UP (ref 6–8.3)
RBC # BLD: 5.14 M/UL — SIGNIFICANT CHANGE UP (ref 4.2–5.8)
RBC # FLD: 13 % — SIGNIFICANT CHANGE UP (ref 10.3–14.5)
SODIUM SERPL-SCNC: 137 MMOL/L — SIGNIFICANT CHANGE UP (ref 135–145)
T4 FREE SERPL-MCNC: 1.03 NG/DL — SIGNIFICANT CHANGE UP (ref 0.76–1.46)
TRIGL SERPL-MCNC: 137 MG/DL — SIGNIFICANT CHANGE UP
TSH SERPL-MCNC: 4.84 UU/ML — HIGH (ref 0.34–4.82)
WBC # BLD: 11.34 K/UL — HIGH (ref 3.8–10.5)
WBC # FLD AUTO: 11.34 K/UL — HIGH (ref 3.8–10.5)

## 2024-09-12 PROCEDURE — 99232 SBSQ HOSP IP/OBS MODERATE 35: CPT

## 2024-09-12 RX ORDER — PANTOPRAZOLE SODIUM 40 MG
40 TABLET, DELAYED RELEASE (ENTERIC COATED) ORAL
Refills: 0 | Status: DISCONTINUED | OUTPATIENT
Start: 2024-09-13 | End: 2024-09-13

## 2024-09-12 RX ORDER — HYDROMORPHONE HYDROCHLORIDE 2 MG/1
2 TABLET ORAL EVERY 4 HOURS
Refills: 0 | Status: DISCONTINUED | OUTPATIENT
Start: 2024-09-12 | End: 2024-09-13

## 2024-09-12 RX ADMIN — ACETAMINOPHEN 650 MILLIGRAM(S): 325 TABLET ORAL at 18:25

## 2024-09-12 RX ADMIN — Medication 81 MILLIGRAM(S): at 09:42

## 2024-09-12 RX ADMIN — Medication 10 MG/HR: at 12:58

## 2024-09-12 RX ADMIN — HYDROMORPHONE HYDROCHLORIDE 1 MILLIGRAM(S): 2 TABLET ORAL at 02:08

## 2024-09-12 RX ADMIN — Medication 5 MILLIGRAM(S): at 21:50

## 2024-09-12 RX ADMIN — HYDROMORPHONE HYDROCHLORIDE 1 MILLIGRAM(S): 2 TABLET ORAL at 02:25

## 2024-09-12 RX ADMIN — Medication 150 MILLILITER(S): at 17:26

## 2024-09-12 RX ADMIN — ACETAMINOPHEN 650 MILLIGRAM(S): 325 TABLET ORAL at 17:25

## 2024-09-12 RX ADMIN — HYDROMORPHONE HYDROCHLORIDE 1 MILLIGRAM(S): 2 TABLET ORAL at 06:22

## 2024-09-12 RX ADMIN — HYDROMORPHONE HYDROCHLORIDE 1 MILLIGRAM(S): 2 TABLET ORAL at 12:30

## 2024-09-12 RX ADMIN — Medication 10 MG/HR: at 02:12

## 2024-09-12 RX ADMIN — Medication 150 MILLILITER(S): at 11:11

## 2024-09-12 RX ADMIN — HYDROMORPHONE HYDROCHLORIDE 1 MILLIGRAM(S): 2 TABLET ORAL at 06:42

## 2024-09-12 RX ADMIN — HYDROMORPHONE HYDROCHLORIDE 1 MILLIGRAM(S): 2 TABLET ORAL at 12:15

## 2024-09-12 RX ADMIN — Medication 150 MILLILITER(S): at 23:38

## 2024-09-12 RX ADMIN — SODIUM CHLORIDE 150 MILLILITER(S): 9 INJECTION INTRAMUSCULAR; INTRAVENOUS; SUBCUTANEOUS at 05:25

## 2024-09-12 NOTE — PROGRESS NOTE ADULT - ASSESSMENT
63 y/o male with past medical history of  high cholesterol presents  on 9/11 with upper abdominal pain since this morning following a protein shake. Pt says pain went away then returned. Pt denies nausea/vomiting, and had a normal bowel movement this morning, denies fever or urinary sx, never smoker, social drinker, Pain improved with Morphine, recurring. Suspect GS per US; adm  NPO, MRCP and surgical consult    # Epigastric abdominal pain duie to moderate to severe  Acute Pancreatitis  - CT abd Moderately severe acute interstitial/edematous pancreatitis.  - MRCP - thickened GB with sludge, no biliary dilation, 2.4 Left  exophytic renal cyst Acute interstitial pancreatitis without evidence of necrosis.  - Lipase trending down > 5000-- > 1235   - Lfts wnl, check GGT ? alcohol use induced  - NPO   - IVF  - pain management - po dilaudid, IV dilaudid  - lipid profile   , WBC 12--> 11  - consult surgery  - hold allopurinol - can cause pancreatitis in 12%   - c/w PPI IV --> PO   - GI consult     # Erythrocytosis, resolved   - s/p IV fluids    # Mildly elevated TSH   - free t4 wnl   - o/p monitoring    # HLD   - hold statin for now    # Gout   - allopurinol on hold      Dispo - NPO, IV fluids, GI and surgery evaluation 63 y/o male with past medical history of  high cholesterol presents  on 9/11 with upper abdominal pain since this morning following a protein shake. Pt says pain went away then returned. Pt denies nausea/vomiting, and had a normal bowel movement this morning, denies fever or urinary sx, never smoker, social drinker, Pain improved with Morphine, recurring. Suspect GS per US; adm  NPO, MRCP and surgical consult    # Epigastric abdominal pain duie to moderate to severe  Acute Pancreatitis  - CT abd Moderately severe acute interstitial/edematous pancreatitis.  - MRCP - thickened GB with sludge, no biliary dilation, 2.4 Left  exophytic renal cyst Acute interstitial pancreatitis without evidence of necrosis.  - Lipase trending down > 5000-- > 1235   - Lfts wnl, check GGT ? alcohol use induced  - NPO   - IVF  - pain management - po dilaudid, IV dilaudid  - lipid profile   , WBC 12--> 11  - consult surgery  - hold allopurinol - can cause pancreatitis in 12%   - c/w PPI IV --> PO   - GI consult     #  2.4 Left  exophytic renal cyst    -  o/p monitoring    # Erythrocytosis, resolved   - s/p IV fluids    # Mildly elevated TSH   - free t4 wnl   - o/p monitoring    # HLD   - hold statin for now    # Gout   - allopurinol on hold      Dispo - NPO, IV fluids, GI and surgery evaluation

## 2024-09-12 NOTE — CONSULT NOTE ADULT - NS ATTEND AMEND GEN_ALL_CORE FT
63 yo male with pancreatitis, had a glass of alcohol, takes allopurinol. MRCP showed sludge, NL LFTs  Abd mildly tender epigastrium, no rebound    -Alchol related pancreatitis? vs medication?  -IVF  NPO

## 2024-09-12 NOTE — CONSULT NOTE ADULT - SUBJECTIVE AND OBJECTIVE BOX
GENERAL SURGERY CONSULT NOTE  --------------------------------------------------------------------------------------------    Patient is a 64y old  Male who presents with a chief complaint of abd pain (11 Sep 2024 13:50)      HPI: 65 yo male with PMHx GERD, hyperlipidemia, no PSX who presents with upper abdominal pain, chills x 2 days. No previous similar episodes. Denies fatty diet, endorses weekend wine drinker, not during the week and has never had withdrawals.     ROS: 10-system review is otherwise negative except HPI above.      PAST MEDICAL & SURGICAL HISTORY:  Hyperlipidemia, unspecified hyperlipidemia type      No significant past surgical history        FAMILY HISTORY:  No pertinent family history in first degree relatives      [] Family history not pertinent as reviewed with the patient and family    ALLERGIES: No Known Allergies      CURRENT MEDICATIONS  MEDICATIONS (STANDING): aspirin enteric coated 81 milliGRAM(s) Oral daily  dextrose 5% + sodium chloride 0.9%. 1000 milliLiter(s) IV Continuous <Continuous>  heparin   Injectable 5000 Unit(s) SubCutaneous every 8 hours  pantoprazole Infusion 8 mG/Hr IV Continuous <Continuous>    MEDICATIONS (PRN):acetaminophen     Tablet .. 650 milliGRAM(s) Oral every 6 hours PRN Temp greater or equal to 38C (100.4F), Mild Pain (1 - 3)  aluminum hydroxide/magnesium hydroxide/simethicone Suspension 30 milliLiter(s) Oral every 4 hours PRN Dyspepsia  HYDROmorphone  Injectable 1 milliGRAM(s) IV Push every 4 hours PRN Severe Pain (7 - 10)  melatonin 3 milliGRAM(s) Oral at bedtime PRN Insomnia  morphine  - Injectable 4 milliGRAM(s) IV Push every 3 hours PRN Moderate Pain (4 - 6)  ondansetron Injectable 4 milliGRAM(s) IV Push every 8 hours PRN Nausea and/or Vomiting    --------------------------------------------------------------------------------------------    Vitals:   T(C): 36.8 (09-12-24 @ 13:52), Max: 36.9 (09-12-24 @ 00:09)  HR: 76 (09-12-24 @ 13:52) (66 - 76)  BP: 105/68 (09-12-24 @ 13:52) (105/68 - 130/82)  RR: 18 (09-12-24 @ 13:52) (17 - 18)  SpO2: 93% (09-12-24 @ 13:52) (92% - 96%)  CAPILLARY BLOOD GLUCOSE        CAPILLARY BLOOD GLUCOSE          09-11 @ 07:01  -  09-12 @ 07:00  --------------------------------------------------------  IN:    Pantoprazole: 130 mL    sodium chloride 0.9%: 2400 mL  Total IN: 2530 mL    OUT:  Total OUT: 0 mL    Total NET: 2530 mL      09-12 @ 07:01  -  09-12 @ 18:12  --------------------------------------------------------  IN:    dextrose 5% + sodium chloride 0.9%: 600 mL    Pantoprazole: 40 mL  Total IN: 640 mL    OUT:  Total OUT: 0 mL    Total NET: 640 mL          Weight (kg): 95.5 (09-11 @ 10:07)    PHYSICAL EXAM:   General: NAD, Lying in bed comfortably  Neuro: A+Ox3  GI/Abd: Soft, non distended, tender to deep palpation across upper abdomen, negative Cox's   --------------------------------------------------------------------------------------------    LABS  CBC (09-12 @ 09:16)                              15.1                           11.34<H>  )----------------(  163        81.5<H>% Neutrophils, 10.8<L>% Lymphocytes, ANC: 9.24<H>                              45.3    CBC (09-11 @ 10:37)                              17.1<H>                         12.42<H>  )----------------(  187        81.2<H>% Neutrophils, 12.0<L>% Lymphocytes, ANC: 10.09<H>                              50.1<H>    BMP (09-12 @ 09:16)             137     |  108     |  14    		Ca++ --      Ca 8.8                ---------------------------------( 101<H>		Mg 1.9                4.1     |  26      |  0.98  			Ph 2.7     BMP (09-11 @ 10:37)             139     |  107     |  24<H> 		Ca++ --      Ca 9.9                ---------------------------------( 151<H>		Mg --                 4.6     |  28      |  1.18  			Ph --        LFTs (09-12 @ 09:16)      TPro 6.7 / Alb 3.0<L> / TBili 0.8 / DBili -- / AST 12<L> / ALT 28 / AlkPhos 82  LFTs (09-11 @ 10:37)      TPro 7.9 / Alb 3.8 / TBili 0.6 / DBili -- / AST 23 / ALT 35 / AlkPhos 94            --------------------------------------------------------------------------------------------    MICROBIOLOGY  Urinalysis (09-12 @ 09:16):     Color:  / Appearance:  / SG:  / pH:  / Gluc: 101<H> / Ketones:  / Bili:  / Urobili:  / Protein : / Nitrites:  / Leuk.Est:  / RBC:  / WBC:  / Sq Epi:  / Non Sq Epi:  / Bacteria          --------------------------------------------------------------------------------------------    IMAGING  < from: MR MRCP w/wo IV Cont (09.11.24 @ 19:30) >  IMPRESSION:  Acute interstitial pancreatitis without evidence of necrosis.    There is either layering sludge versus tiny stones dependently within the   gallbladder. No biliary ductaldilatation or choledocholithiasis.    < end of copied text >  < from: CT Abdomen and Pelvis w/ IV Cont (09.11.24 @ 11:51) >    IMPRESSION:  Moderately severe acute interstitial/edematous pancreatitis.    < end of copied text >  < from: US Abdomen Upper Quadrant Right (09.11.24 @ 11:15) >  IMPRESSION:  Suspicion for gallstones.  Peripancreatic fluid concerning for pancreatitis.    < end of copied text >

## 2024-09-12 NOTE — CONSULT NOTE ADULT - ASSESSMENT
Patient is a 64y old admitted yesterday with pancreatitis. Suspect alcohol abuse more likely than gallstone induced based on labs and imaging    PLAN:    NPO for tx of pancreatitis  -trend AM labs  -no abx required  -discussed with Dr. Patten

## 2024-09-13 ENCOUNTER — TRANSCRIPTION ENCOUNTER (OUTPATIENT)
Age: 64
End: 2024-09-13

## 2024-09-13 VITALS
HEART RATE: 95 BPM | TEMPERATURE: 98 F | SYSTOLIC BLOOD PRESSURE: 129 MMHG | RESPIRATION RATE: 18 BRPM | OXYGEN SATURATION: 97 % | DIASTOLIC BLOOD PRESSURE: 66 MMHG

## 2024-09-13 LAB
ADD ON TEST-SPECIMEN IN LAB: SIGNIFICANT CHANGE UP
ALBUMIN SERPL ELPH-MCNC: 3 G/DL — LOW (ref 3.3–5)
ALP SERPL-CCNC: 85 U/L — SIGNIFICANT CHANGE UP (ref 40–120)
ALT FLD-CCNC: 24 U/L — SIGNIFICANT CHANGE UP (ref 12–78)
AMYLASE P1 CFR SERPL: 256 U/L — HIGH (ref 25–115)
ANION GAP SERPL CALC-SCNC: 8 MMOL/L — SIGNIFICANT CHANGE UP (ref 5–17)
AST SERPL-CCNC: 14 U/L — LOW (ref 15–37)
BILIRUB DIRECT SERPL-MCNC: 0.3 MG/DL — SIGNIFICANT CHANGE UP (ref 0–0.3)
BILIRUB INDIRECT FLD-MCNC: 0.6 MG/DL — SIGNIFICANT CHANGE UP (ref 0.2–1)
BILIRUB SERPL-MCNC: 0.9 MG/DL — SIGNIFICANT CHANGE UP (ref 0.2–1.2)
BUN SERPL-MCNC: 8 MG/DL — SIGNIFICANT CHANGE UP (ref 7–23)
CALCIUM SERPL-MCNC: 9 MG/DL — SIGNIFICANT CHANGE UP (ref 8.5–10.1)
CHLORIDE SERPL-SCNC: 110 MMOL/L — HIGH (ref 96–108)
CO2 SERPL-SCNC: 25 MMOL/L — SIGNIFICANT CHANGE UP (ref 22–31)
CREAT SERPL-MCNC: 1.05 MG/DL — SIGNIFICANT CHANGE UP (ref 0.5–1.3)
CRP SERPL-MCNC: 149 MG/L — HIGH
EGFR: 78 ML/MIN/1.73M2 — SIGNIFICANT CHANGE UP
FERRITIN SERPL-MCNC: 438 NG/ML — HIGH (ref 30–400)
FOLATE SERPL-MCNC: 6.4 NG/ML — SIGNIFICANT CHANGE UP
GGT SERPL-CCNC: 85 U/L — HIGH (ref 9–50)
GLUCOSE SERPL-MCNC: 141 MG/DL — HIGH (ref 70–99)
HCT VFR BLD CALC: 42.9 % — SIGNIFICANT CHANGE UP (ref 39–50)
HGB BLD-MCNC: 14.7 G/DL — SIGNIFICANT CHANGE UP (ref 13–17)
INR BLD: 1.18 RATIO — SIGNIFICANT CHANGE UP (ref 0.85–1.18)
IRON SATN MFR SERPL: 15 UG/DL — LOW (ref 45–165)
IRON SATN MFR SERPL: 7 % — LOW (ref 16–55)
LIDOCAIN IGE QN: 309 U/L — HIGH (ref 13–75)
MAGNESIUM SERPL-MCNC: 2 MG/DL — SIGNIFICANT CHANGE UP (ref 1.6–2.6)
MCHC RBC-ENTMCNC: 29.9 PG — SIGNIFICANT CHANGE UP (ref 27–34)
MCHC RBC-ENTMCNC: 34.3 GM/DL — SIGNIFICANT CHANGE UP (ref 32–36)
MCV RBC AUTO: 87.4 FL — SIGNIFICANT CHANGE UP (ref 80–100)
PHOSPHATE SERPL-MCNC: 2.3 MG/DL — LOW (ref 2.5–4.5)
PLATELET # BLD AUTO: 160 K/UL — SIGNIFICANT CHANGE UP (ref 150–400)
POTASSIUM SERPL-MCNC: 3.6 MMOL/L — SIGNIFICANT CHANGE UP (ref 3.5–5.3)
POTASSIUM SERPL-SCNC: 3.6 MMOL/L — SIGNIFICANT CHANGE UP (ref 3.5–5.3)
PROCALCITONIN SERPL-MCNC: 0.07 NG/ML — SIGNIFICANT CHANGE UP (ref 0.02–0.1)
PROT SERPL-MCNC: 6.6 GM/DL — SIGNIFICANT CHANGE UP (ref 6–8.3)
PROTHROM AB SERPL-ACNC: 13.3 SEC — HIGH (ref 9.5–13)
RBC # BLD: 4.91 M/UL — SIGNIFICANT CHANGE UP (ref 4.2–5.8)
RBC # FLD: 12.9 % — SIGNIFICANT CHANGE UP (ref 10.3–14.5)
SODIUM SERPL-SCNC: 143 MMOL/L — SIGNIFICANT CHANGE UP (ref 135–145)
T3FREE SERPL-MCNC: 2.64 PG/ML — SIGNIFICANT CHANGE UP (ref 2–4.4)
TIBC SERPL-MCNC: 225 UG/DL — SIGNIFICANT CHANGE UP (ref 220–430)
TROPONIN I, HIGH SENSITIVITY RESULT: 6.09 NG/L — SIGNIFICANT CHANGE UP
UIBC SERPL-MCNC: 210 UG/DL — SIGNIFICANT CHANGE UP (ref 110–370)
VIT B12 SERPL-MCNC: 520 PG/ML — SIGNIFICANT CHANGE UP (ref 232–1245)
WBC # BLD: 11.34 K/UL — HIGH (ref 3.8–10.5)
WBC # FLD AUTO: 11.34 K/UL — HIGH (ref 3.8–10.5)

## 2024-09-13 PROCEDURE — 99239 HOSP IP/OBS DSCHRG MGMT >30: CPT

## 2024-09-13 RX ORDER — HYDROMORPHONE HYDROCHLORIDE 2 MG/1
1 TABLET ORAL
Qty: 20 | Refills: 0
Start: 2024-09-13 | End: 2024-09-17

## 2024-09-13 RX ORDER — ROSUVASTATIN CALCIUM 10 MG/1
1 TABLET ORAL
Refills: 0 | DISCHARGE

## 2024-09-13 RX ORDER — ACETAMINOPHEN 325 MG/1
1 TABLET ORAL
Qty: 90 | Refills: 0
Start: 2024-09-13 | End: 2024-10-12

## 2024-09-13 RX ORDER — ALLOPURINOL 300 MG
1 TABLET ORAL
Refills: 0 | DISCHARGE

## 2024-09-13 RX ADMIN — Medication 81 MILLIGRAM(S): at 09:32

## 2024-09-13 RX ADMIN — Medication 150 MILLILITER(S): at 06:28

## 2024-09-13 RX ADMIN — Medication 40 MILLIGRAM(S): at 06:14

## 2024-09-13 NOTE — DISCHARGE NOTE PROVIDER - NSDCCPCAREPLAN_GEN_ALL_CORE_FT
PRINCIPAL DISCHARGE DIAGNOSIS  Diagnosis: Pancreatitis  Assessment and Plan of Treatment: avoid alcohol, eat low fat diet, take pain medications as prescribed, drink plenty of fluids, follow up with surgeon and GI Dr. Escobedo within 1 week for repeat MRI in 6-8 weeks after inflamation subsides   return to ED if fever, chills, worsening of abdominal pain, or any other concerns'  stop taking allopurinol      SECONDARY DISCHARGE DIAGNOSES  Diagnosis: Renal cyst  Assessment and Plan of Treatment: seen on CT, follow up with your PCP within 1 week for further monitoring and management    Diagnosis: Prediabetes  Assessment and Plan of Treatment: your A1C 5.7, repeat the test in 3 month, follow low carbohydrates diet (less sugar, less bread, pasta, rice)    Diagnosis: Hyperlipidemia  Assessment and Plan of Treatment: stop taking rosuvastatin, rstart once your liver function test will be normal    Diagnosis: Gout  Assessment and Plan of Treatment: stop taking allopurinol , follow up with your PCP for further monitoring and management of your gout    Diagnosis: Elevated liver function tests  Assessment and Plan of Treatment: repeat liver function tests in 1 week follow up with GI within  1 week

## 2024-09-13 NOTE — DISCHARGE NOTE PROVIDER - NSDCCPTREATMENT_GEN_ALL_CORE_FT
PRINCIPAL PROCEDURE  Procedure: MR MRCP  Findings and Treatment:   LOWER CHEST: Within normal limits.  LIVER: Within normal limits.  BILE DUCTS: Normal caliber. No filling defects identified.  GALLBLADDER: There is either layering sludge versus tiny stones within   the gallbladder dependently. The gallbladder is mildly thick-walled and   nondistended.  SPLEEN: Within normal limits.  PANCREAS: Diffuse peripancreatic edema and peripancreatic fluid,   particularly along the body and tail.  ADRENALS: Within normal limits.  KIDNEYS/URETERS: There is a 2.4 cm exophytic left renal cyst. The kidneys   are otherwise unremarkable in appearance.  VISUALIZED PORTIONS:  BOWEL: Within normal limits.  PERITONEUM: Small volume free fluid along the pancreatic body and tail   coursing along the left paracolic gutter into the pelvis.  VESSELS: Within normal limits. The splenic vein is patent.  RETROPERITONEUM/LYMPH NODES: No lymphadenopathy.  ABDOMINAL WALL: Within normal limits.  BONES: Within normal limits.  IMPRESSION:  Acute interstitial pancreatitis without evidence of necrosis.  There is either layering sludge versus tiny stones dependently within the   gallbladder. No biliary ductaldilatation or choledocholithiasis.

## 2024-09-13 NOTE — PROGRESS NOTE ADULT - ASSESSMENT
63 yo male with pancreatitis  -Low fat diet  -d/c home  -pancreatitis could be due to allopurinol or alcohol. Will avoid both and assess if pancreatitis occur in the future. If he gets another episode he might need lap zara

## 2024-09-13 NOTE — PROGRESS NOTE ADULT - SUBJECTIVE AND OBJECTIVE BOX
63 yo male with pancreatitis, alcohol? allopurinol?, no abd pain    Abd soft, NT          09-13-24 @ 09:17    Vital Signs Last 24 Hrs  T(C): 36.5 (13 Sep 2024 08:36), Max: 37.1 (12 Sep 2024 23:58)  T(F): 97.7 (13 Sep 2024 08:36), Max: 98.7 (12 Sep 2024 23:58)  HR: 95 (13 Sep 2024 08:36) (66 - 95)  BP: 129/66 (13 Sep 2024 08:36) (105/68 - 129/66)  BP(mean): --  RR: 18 (13 Sep 2024 08:36) (18 - 18)  SpO2: 97% (13 Sep 2024 08:36) (93% - 99%)    Parameters below as of 13 Sep 2024 08:36  Patient On (Oxygen Delivery Method): room air                              14.7   11.34 )-----------( 160      ( 13 Sep 2024 08:35 )             42.9       09-12    137  |  108  |  14  ----------------------------<  101<H>  4.1   |  26  |  0.98    Ca    8.8      12 Sep 2024 09:16  Phos  2.7     09-12  Mg     1.9     09-12    TPro  6.7  /  Alb  3.0<L>  /  TBili  0.8  /  DBili  x   /  AST  12<L>  /  ALT  28  /  AlkPhos  82  09-12      LIVER FUNCTIONS - ( 12 Sep 2024 09:16 )  Alb: 3.0 g/dL / Pro: 6.7 gm/dL / ALK PHOS: 82 U/L / ALT: 28 U/L / AST: 12 U/L / GGT: x             MEDICATIONS  (STANDING):  aspirin enteric coated 81 milliGRAM(s) Oral daily  bisacodyl 5 milliGRAM(s) Oral at bedtime  dextrose 5% + sodium chloride 0.9%. 1000 milliLiter(s) (150 mL/Hr) IV Continuous <Continuous>  heparin   Injectable 5000 Unit(s) SubCutaneous every 8 hours  pantoprazole    Tablet 40 milliGRAM(s) Oral before breakfast    MEDICATIONS  (PRN):  acetaminophen     Tablet .. 650 milliGRAM(s) Oral every 6 hours PRN Temp greater or equal to 38C (100.4F), Mild Pain (1 - 3)  aluminum hydroxide/magnesium hydroxide/simethicone Suspension 30 milliLiter(s) Oral every 4 hours PRN Dyspepsia  HYDROmorphone   Tablet 2 milliGRAM(s) Oral every 4 hours PRN Moderate Pain (4 - 6)  HYDROmorphone  Injectable 1 milliGRAM(s) IV Push every 4 hours PRN Severe Pain (7 - 10)  melatonin 3 milliGRAM(s) Oral at bedtime PRN Insomnia  ondansetron Injectable 4 milliGRAM(s) IV Push every 8 hours PRN Nausea and/or Vomiting      MEDICATIONS  (STANDING):  aspirin enteric coated 81 milliGRAM(s) Oral daily  bisacodyl 5 milliGRAM(s) Oral at bedtime  dextrose 5% + sodium chloride 0.9%. 1000 milliLiter(s) (150 mL/Hr) IV Continuous <Continuous>  heparin   Injectable 5000 Unit(s) SubCutaneous every 8 hours  pantoprazole    Tablet 40 milliGRAM(s) Oral before breakfast      
Subjective:  Chief complain : epigastric  abd pain     HPI:  63 y/o male with past medical history of  high cholesterol presents  on 9/11 with upper abdominal pain since this morning following a protein shake. Pt says pain went away then returned. Pt denies nausea/vomiting, and had a normal bowel movement this morning, denies fever or urinary sx, never smoker, social drinker, Pain improved with Morphine, recurring. Suspect GS per US; adm  NPO, MRCP and surgical consult    9/12 -   Patient seen and examined at bedside earlier today, abdominal pain controlled with meds, denies nausea, vomiting, reports drinking 2-3 glasses of ETOH every other week, afebrile, plan discussed    Review of system- Rest of the review of system are negative except mentioned in HPI     Vital sings reviewed for last 24 h  T(C): 36.8 (09-12-24 @ 13:52), Max: 36.9 (09-12-24 @ 00:09)  HR: 76 (09-12-24 @ 13:52) (66 - 76)  BP: 105/68 (09-12-24 @ 13:52) (105/68 - 130/82)  RR: 18 (09-12-24 @ 13:52) (17 - 18)  SpO2: 93% (09-12-24 @ 13:52) (92% - 96%)        Physical exam:   General : NAD, appear to be of stated age , well groomed   NERVOUS SYSTEM:  Alert & Oriented X3, non- focal exam, Motor Strength 5/5 B/L upper and lower extremities; DTRs 2+ intact and symmetric  HEAD:  Atraumatic, Normocephalic  EYES: EOMI, PERRLA, conjunctiva and sclera clear  HEENT: Moist mucous membranes, Supple neck , No JVD  CHEST: Clear to auscultation bilaterally; No rales, no rhonchi, no wheezing  HEART: Regular rate and rhythm; No murmurs, no rubs or gallops  ABDOMEN: Soft, mild RUQ tenderness, Non-distended; Bowel sounds present, no guarding , no peritoneal irritation   GENITOURINARY- Voiding, no suprapubic tenderness  EXTREMITIES:  2+ Peripheral Pulses, No clubbing, cyanosis,   edema  MUSCULOSKELETAL:- No muscle tenderness, Muscle tone normal, No joint tenderness, no Joint swelling,  Joint ROM –normal   SKIN-no rash, no lesion    Labs radiologic and other test : all reviewed and interpreted :                         15.1   11.34 )-----------( 163      ( 12 Sep 2024 09:16 )             45.3     09-12    137  |  108  |  14  ----------------------------<  101<H>  4.1   |  26  |  0.98    Ca    8.8      12 Sep 2024 09:16  Phos  2.7     09-12  Mg     1.9     09-12    TPro  6.7  /  Alb  3.0<L>  /  TBili  0.8  /  DBili  x   /  AST  12<L>  /  ALT  28  /  AlkPhos  82  09-12      Amylase / Lipase Trend  09-12-24 @ 09:16   -   724<H> / 1235<H>  09-11-24 @ 10:37   -   -- / >5000<H>     MR MRCP w/wo IV Cont (09.11.24 @ 19:30) >  Evaluation is somewhat limited due to respiratory motion artifact.    LOWER CHEST: Within normal limits.    LIVER: Within normal limits.  BILE DUCTS: Normal caliber. No filling defects identified.  GALLBLADDER: There is either layering sludge versus tiny stones within   the gallbladder dependently. The gallbladder is mildly thick-walled and   nondistended.  SPLEEN: Within normal limits.  PANCREAS: Diffuse peripancreatic edema and peripancreatic fluid,   particularly along the body and tail.  ADRENALS: Within normal limits.  KIDNEYS/URETERS: There is a 2.4 cm exophytic left renal cyst. The kidneys   are otherwise unremarkable in appearance.    VISUALIZED PORTIONS:  BOWEL: Within normal limits.  PERITONEUM: Small volume free fluid along the pancreatic body and tail   coursing along the left paracolic gutter into the pelvis.  VESSELS: Within normal limits. The splenic vein is patent.  RETROPERITONEUM/LYMPH NODES: No lymphadenopathy.  ABDOMINAL WALL: Within normal limits.  BONES: Within normal limits.    IMPRESSION:  Acute interstitial pancreatitis without evidence of necrosis.    There is either layering sludge versus tiny stones dependently within the   gallbladder. No biliary ductaldilatation or choledocholithiasis.    < from: CT Abdomen and Pelvis w/ IV Cont (09.11.24 @ 11:51) >  IMPRESSION:  Moderately severe acute interstitial/edematous pancreatitis.      < end of copied text >              RECENT CULTURES:    Cardiac testing : reviewed   EKG < from: 12 Lead ECG (09.11.24 @ 10:23) >    Atrial Rate 64 BPM    P-R Interval 146 ms    QRS Duration 80 ms    Q-T Interval 410 ms    QTC Calculation(Bazett) 422 ms    P Axis 59 degrees    R Axis 58 degrees    T Axis 63 degrees    Diagnosis Line Normal sinus rhythm  Normal ECG  When compared with ECG of 05-JUL-2017 06:50,  No significant change was found    < end of copied text >      Procedures :     Devices:     Current medications:  acetaminophen     Tablet .. 650 milliGRAM(s) Oral every 6 hours PRN  aluminum hydroxide/magnesium hydroxide/simethicone Suspension 30 milliLiter(s) Oral every 4 hours PRN  aspirin enteric coated 81 milliGRAM(s) Oral daily  bisacodyl 5 milliGRAM(s) Oral at bedtime  dextrose 5% + sodium chloride 0.9%. 1000 milliLiter(s) IV Continuous <Continuous>  heparin   Injectable 5000 Unit(s) SubCutaneous every 8 hours  HYDROmorphone   Tablet 2 milliGRAM(s) Oral every 4 hours PRN  HYDROmorphone  Injectable 1 milliGRAM(s) IV Push every 4 hours PRN  melatonin 3 milliGRAM(s) Oral at bedtime PRN  ondansetron Injectable 4 milliGRAM(s) IV Push every 8 hours PRN  pantoprazole Infusion 8 mG/Hr IV Continuous <Continuous>

## 2024-09-13 NOTE — DISCHARGE NOTE NURSING/CASE MANAGEMENT/SOCIAL WORK - PATIENT PORTAL LINK FT
You can access the FollowMyHealth Patient Portal offered by [facility] by registering at the following website: [email or web address]. By joining [employer]’s FollowMyHealth portal, you will also be able to view your health information using other applications (apps) compatible with our system.

## 2024-09-13 NOTE — CHART NOTE - NSCHARTNOTEFT_GEN_A_CORE
64 year old male with history social etoh, hyperlipidemia, gout on allopurinol presenting with abdominal pain, acute pancreatitis with gallstones, no biliary dilatation on CT imaging.  No evidence of biliary obstruction as ducts non-dilated, low likelihood gallstone pancreatitis. More likely related to medications or etoh although can not definitively evaluate structure of PD /pancreas given current inflammation.  Suggest patient follow up outpatient in 6-8 weeks and will obtain outpatient MRI.    For now, patient is eating and tolerating low fat diet and fluids without nausea, vomiting, or increase in pain. Pain described as "dull" and 2/10 compared to 10/10 presenting pain.  If continues to tolerate as above, no objection from GI standpoint for DC to home with follow up outpatient.

## 2024-09-13 NOTE — DISCHARGE NOTE PROVIDER - CARE PROVIDER_API CALL
Robb Shabazz  Surgery  [address] [place] [address]  Phone: [phone]  Fax: [phone]  Follow Up Time: 1 week    Ludwin Escobedo  Gastroenterology  [address] [place] [address]  Phone: [phone]  Fax: [phone]  Follow Up Time: 1 week    MONSERRAT OCONNELL, MELISSA TORRES, Phys,    Phone: ()-  Fax: ()-  Follow Up Time: 1 week

## 2024-09-13 NOTE — DISCHARGE NOTE NURSING/CASE MANAGEMENT/SOCIAL WORK - NSDCPEFALRISK_GEN_ALL_CORE
For information on Fall & Injury Prevention, visit: [email or web address] OR  [email or web address] OR  https://www.cdc.gov/steadi/patient.html

## 2024-09-13 NOTE — DISCHARGE NOTE PROVIDER - PROVIDER TOKENS
PROVIDER:[TOKEN:[[id number]],FOLLOWUP:[1 week]],PROVIDER:[TOKEN:[[id number]],FOLLOWUP:[1 week]],PROVIDER:[TOKEN:[[id number]],FOLLOWUP:[1 week]]

## 2024-09-13 NOTE — DISCHARGE NOTE PROVIDER - CARE PROVIDERS DIRECT ADDRESSES
,DirectAddress_Unknown,[email or web address],DirectAddress_Unknown

## 2024-09-13 NOTE — DISCHARGE NOTE PROVIDER - HOSPITAL COURSE
Chief complain : epigastric  abd pain     HPI:  65 y/o male with past medical history of  high cholesterol presents  on 9/11 with upper abdominal pain since this morning following a protein shake. Pt says pain went away then returned. Pt denies nausea/vomiting, and had a normal bowel movement this morning, denies fever or urinary sx, never smoker, social drinker, Pain improved with Morphine, recurring. Suspect GS per US; adm  NPO, MRCP and surgical consult    9/12 -   Patient seen and examined at bedside earlier today, abdominal pain controlled with meds, denies nausea, vomiting, reports drinking 2-3 glasses of ETOH every other week, afebrile, plan discussed  9/13 - feels better, pain minimal, denies cp, dyspnea, diarrhea, afebrile  Review of system- Rest of the review of system are negative except mentioned in HPI  Vital sings reviewed for last 24 h  T(C): 36.5 (09-13-24 @ 08:36), Max: 37.1 (09-12-24 @ 23:58)  T(F): 97.7 (09-13-24 @ 08:36), Max: 98.7 (09-12-24 @ 23:58)  HR: 95 (09-13-24 @ 08:36) (66 - 95)  BP: 129/66 (09-13-24 @ 08:36) (128/89 - 129/66)  RR: 18 (09-13-24 @ 08:36) (18 - 18)  SpO2: 97% (09-13-24 @ 08:36) (97% - 99%)  Physical exam:   General : NAD, appear to be of stated age , well groomed   NERVOUS SYSTEM:  Alert & Oriented X3, non- focal exam, Motor Strength 5/5 B/L upper and lower extremities; DTRs 2+ intact and symmetric  HEAD:  Atraumatic, Normocephalic  EYES: EOMI, PERRLA, conjunctiva and sclera clear  HEENT: Moist mucous membranes, Supple neck , No JVD  CHEST: Clear to auscultation bilaterally; No rales, no rhonchi, no wheezing  HEART: Regular rate and rhythm; No murmurs, no rubs or gallops  ABDOMEN: Soft, mild RUQ tenderness, Non-distended; Bowel sounds present, no guarding , no peritoneal irritation   GENITOURINARY- Voiding, no suprapubic tenderness  EXTREMITIES:  2+ Peripheral Pulses, No clubbing, cyanosis,   edema  MUSCULOSKELETAL:- No muscle tenderness, Muscle tone normal, No joint tenderness, no Joint swelling,  Joint ROM –normal   SKIN-no rash, no lesion    Labs radiologic and other test : all reviewed and interpreted :   · Assessment    65 y/o male with past medical history of  high cholesterol presents  on 9/11 with upper abdominal pain since this morning following a protein shake. Pt says pain went away then returned. Pt denies nausea/vomiting, and had a normal bowel movement this morning, denies fever or urinary sx, never smoker, social drinker, Pain improved with Morphine, recurring. Suspect GS per US; adm  NPO, MRCP and surgical consult    # Epigastric abdominal pain duie to moderate to severe  Acute Pancreatitis  - CT abd Moderately severe acute interstitial/edematous pancreatitis.  - MRCP - thickened GB with sludge, no biliary dilation, 2.4 Left  exophytic renal cyst Acute interstitial pancreatitis without evidence of necrosis.  - Lipase trending down > 5000-- > 1235   - Lfts wnl, check GGT ? alcohol use induced  - NPO --> advance the diet   - IVF  - pain management - po dilaudid, IV dilaudid  - lipid profile   , WBC 12--> 11  - consult surgery  - hold allopurinol - can cause pancreatitis in 12%   - c/w PPI IV --> PO   - GI consult  - o/p MRI in 6-8 weeks    #  2.4 Left  exophytic renal cyst   -  o/p monitoring  # Erythrocytosis, resolved  - s/p IV fluids  # Mildly elevated TSH  - free t4 wnl ,  o/p monitoring  # HLD  - hold statin for now   # Gout  - allopurinol on hold    Final diagnosis, treatment plan, and follow-up recommendations were discussed and explained to the patient.   The patient was given an opportunity to ask questions concerning the diagnosis and treatment plan.   The patient acknowledged understanding of the diagnosis, treatment, and follow-up recommendations.   The patient was advised to seek urgent care upon discharge if worsening symptoms develop prior to scheduled follow-up.   Time spent on discharge included time with the patient, and also coordinating discharge care as outlined below.  Discharge note faxed to PCP with my contact information to call me back   PCP Dr. Raymond  Total time spent: 50 min

## 2024-09-13 NOTE — DISCHARGE NOTE PROVIDER - NSDCMRMEDTOKEN_GEN_ALL_CORE_FT
Align 4 mg oral capsule: 1 cap(s) orally once a day  aspirin 81 mg oral delayed release tablet: 1 tab(s) orally once a day  biotin 10 mg oral tablet: 1 tab(s) orally once a day  esomeprazole 40 mg oral delayed release capsule: 1 cap(s) orally every other day  HYDROmorphone 2 mg oral tablet: 1 tab(s) orally every 6 hours as needed for Moderate Pain (4 - 6) MDD: 8  Tylenol 8 Hour 650 mg oral tablet, extended release: 1 tab(s) orally every 8 hours as needed for  mild pain

## 2024-09-16 ENCOUNTER — NON-APPOINTMENT (OUTPATIENT)
Age: 64
End: 2024-09-16

## 2024-09-19 DIAGNOSIS — N28.1 CYST OF KIDNEY, ACQUIRED: ICD-10-CM

## 2024-09-19 DIAGNOSIS — F10.10 ALCOHOL ABUSE, UNCOMPLICATED: ICD-10-CM

## 2024-09-19 DIAGNOSIS — T50.4X5A ADVERSE EFFECT OF DRUGS AFFECTING URIC ACID METABOLISM, INITIAL ENCOUNTER: ICD-10-CM

## 2024-09-19 DIAGNOSIS — M10.9 GOUT, UNSPECIFIED: ICD-10-CM

## 2024-09-19 DIAGNOSIS — K85.30 DRUG INDUCED ACUTE PANCREATITIS WITHOUT NECROSIS OR INFECTION: ICD-10-CM

## 2024-09-19 DIAGNOSIS — K85.20 ALCOHOL INDUCED ACUTE PANCREATITIS WITHOUT NECROSIS OR INFECTION: ICD-10-CM
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
## 2024-09-19 DIAGNOSIS — R79.89 OTHER SPECIFIED ABNORMAL FINDINGS OF BLOOD CHEMISTRY: ICD-10-CM

## 2024-09-19 DIAGNOSIS — E78.00 PURE HYPERCHOLESTEROLEMIA, UNSPECIFIED: ICD-10-CM

## 2024-09-19 DIAGNOSIS — Z79.82 LONG TERM (CURRENT) USE OF ASPIRIN: ICD-10-CM

## 2024-09-19 DIAGNOSIS — R73.03 PREDIABETES: ICD-10-CM

## 2024-09-20 ENCOUNTER — APPOINTMENT (OUTPATIENT)
Dept: GASTROENTEROLOGY | Facility: CLINIC | Age: 64
End: 2024-09-20
Payer: MEDICARE

## 2024-09-20 DIAGNOSIS — E61.1 IRON DEFICIENCY: ICD-10-CM

## 2024-09-20 DIAGNOSIS — Z09 ENCOUNTER FOR FOLLOW-UP EXAMINATION AFTER COMPLETED TREATMENT FOR CONDITIONS OTHER THAN MALIGNANT NEOPLASM: ICD-10-CM

## 2024-09-20 PROCEDURE — 99204 OFFICE O/P NEW MOD 45 MIN: CPT

## 2024-09-20 RX ORDER — SODIUM SULFATE, POTASSIUM SULFATE AND MAGNESIUM SULFATE 1.6; 3.13; 17.5 G/177ML; G/177ML; G/177ML
17.5-3.13-1.6 SOLUTION ORAL
Qty: 2 | Refills: 0 | Status: ACTIVE | COMMUNITY
Start: 2024-09-20 | End: 1900-01-01

## 2024-09-20 NOTE — HISTORY OF PRESENT ILLNESS
[FreeTextEntry1] : Mr. Malone is a 65 yo M with a past medical history of BPH, GERD, pre-diabetes, hyperlipidemia, gout (previously on allopurinol) here for initial visit after episode of acute pancreatitis for which he was at [facility] from 9/11/2024-9/13/2024.   He presented with acute epigastric pain, elevated lipase, and CT abdomen revealed moderately severe acute interstitial/edematous pancreatitis. MRCP was done which again revealed acute interstitial pancreatitis without evidence of necrosis. thickened GB with sludge, no biliary dilation.   No family history of pancreatic cancer or CRC. No prior history of acute pancreatitis. No IVDU, no drugs, no new medications or herbal supplements. As for alcohol use he drinks about 2 glasses of wine every few weeks and did have 3 glasses of wine 3 days before the acute onset of pain. He does endorse loosing 10-15 lbs over the past year with diet changes. Gallbladder in-situ. No family history of autoimmune or genetic issues.   Of note, he did have a Cologuard 5/18/2024 which was negative. While admitted on 9/13/24 he did have iron panel done with total iron 15, TIBC 225, %sat 7, Hb 14.7, ferritin 438 (these labs were obtained during hospitalization for acute pancreatitis). He has never undergone EGD or colonoscopy.

## 2024-09-20 NOTE — HISTORY OF PRESENT ILLNESS
[FreeTextEntry1] : Mr. Malone is a 65 yo M with a past medical history of BPH, GERD, pre-diabetes, hyperlipidemia, gout (previously on allopurinol) here for initial visit after episode of acute pancreatitis for which he was at [facility] from 9/11/2024-9/13/2024.   He presented with acute epigastric pain, elevated lipase, and CT abdomen revealed moderately severe acute interstitial/edematous pancreatitis. MRCP was done which again revealed acute interstitial pancreatitis without evidence of necrosis. thickened GB with sludge, no biliary dilation.   No family history of pancreatic cancer or CRC. No prior history of acute pancreatitis. No IVDU, no drugs, no new medications or herbal supplements. As for alcohol use he drinks about 2 glasses of wine every few weeks and did have 3 glasses of wine 3 days before the acute onset of pain. He does endorse loosing 10-15 lbs over the past year with diet changes. Gallbladder in-situ. No family history of autoimmune or genetic issues.   Of note, he did have a Cologuard 5/18/2024 which was negative. While admitted on 9/13/24 he did have iron panel done with total iron 15, TIBC 225, %sat 7, Hb 14.7, ferritin 438 (these labs were obtained during hospitalization for acute pancreatitis). He has never undergone EGD or colonoscopy.

## 2024-09-20 NOTE — ASSESSMENT
[FreeTextEntry1] : #Acute pancreatitis - Unclear cause of patient's pancreatitis given lack of offending agents - Discussed etiologies of pancreatitis. Galllbladder remains insitu and sludge could be a cause, however no biliary dilation or abnormal LFTs. Given age and unclear cause we cannot rule out malignancy - Plan to obtain MRI/MRCP for further evaluation in a few months after acute inflammation resolves - Patient advised to make follow-up in 3 months  #Iron deficiency without anemia #Colon cancer screening - Patient is at risk of developing anemia given low iron stores. Ferritin was likely elevated in the setting of acute inflammation at the time of lab draw - Patient advised to take iron supplement daily for 6 weeks. Plan to repeat iron panel at that time - Plan for EGD with gastric and duodenal biopsies along with colonoscopy for evaluation of MEI

## 2024-09-23 ENCOUNTER — APPOINTMENT (OUTPATIENT)
Dept: FAMILY MEDICINE | Facility: CLINIC | Age: 64
End: 2024-09-23
Payer: MEDICARE

## 2024-09-23 VITALS
TEMPERATURE: 97.8 F | SYSTOLIC BLOOD PRESSURE: 112 MMHG | WEIGHT: 204 LBS | DIASTOLIC BLOOD PRESSURE: 74 MMHG | BODY MASS INDEX: 32.02 KG/M2 | HEIGHT: 67 IN

## 2024-09-23 DIAGNOSIS — K85.90 ACUTE PANCREATITIS WITHOUT NECROSIS OR INFECTION, UNSPECIFIED: ICD-10-CM

## 2024-09-23 DIAGNOSIS — K80.20 CALCULUS OF GALLBLADDER W/OUT CHOLECYSTITIS W/OUT OBSTRUCTION: ICD-10-CM

## 2024-09-23 DIAGNOSIS — R74.8 ABNORMAL LEVELS OF OTHER SERUM ENZYMES: ICD-10-CM

## 2024-09-23 DIAGNOSIS — R79.89 OTHER SPECIFIED ABNORMAL FINDINGS OF BLOOD CHEMISTRY: ICD-10-CM

## 2024-09-23 DIAGNOSIS — K83.8 OTHER SPECIFIED DISEASES OF BILIARY TRACT: ICD-10-CM

## 2024-09-23 PROCEDURE — 36415 COLL VENOUS BLD VENIPUNCTURE: CPT

## 2024-09-23 PROCEDURE — 99495 TRANSJ CARE MGMT MOD F2F 14D: CPT

## 2024-09-23 NOTE — PLAN
[FreeTextEntry1] : see lab orders   NO fried and/or fatty foods  NO ETOH!!!! increase hydration!!!!!  f/u pending lab results  cont to HOLD statin and Allopurinol pending lab results    repeat MRI in 6M for reeval of pancreas and gallbladder as per GI

## 2024-09-23 NOTE — HISTORY OF PRESENT ILLNESS
[Post-hospitalization from ___ Hospital] : Post-hospitalization from [unfilled] Hospital [Admitted on: ___] : The patient was admitted on [unfilled] [Discharged on ___] : discharged on [unfilled] [FreeTextEntry2] : 65 yo male presents to office s/p HH admission 9/11/24-9/13/24 2n2 acute pancreatitis and ?? cholelithiasis c biliary sludge.   Today pt feels well c virtually resolved abd pain  Denies N/V  loose stools continue .  Last BM earlier today   Denies bloody/black stools  no urinary complaints  Denies f/c/s

## 2024-09-25 LAB
ALBUMIN SERPL ELPH-MCNC: 3.9 G/DL
ALP BLD-CCNC: 179 U/L
ALT SERPL-CCNC: 34 U/L
AMYLASE/CREAT SERPL: 86 U/L
ANION GAP SERPL CALC-SCNC: 13 MMOL/L
AST SERPL-CCNC: 14 U/L
BASOPHILS # BLD AUTO: 0.11 K/UL
BASOPHILS NFR BLD AUTO: 1.2 %
BILIRUB SERPL-MCNC: 0.2 MG/DL
BUN SERPL-MCNC: 16 MG/DL
CALCIUM SERPL-MCNC: 9.5 MG/DL
CHLORIDE SERPL-SCNC: 103 MMOL/L
CO2 SERPL-SCNC: 24 MMOL/L
CREAT SERPL-MCNC: 1.09 MG/DL
EGFR: 75 ML/MIN/1.73M2
EOSINOPHIL # BLD AUTO: 0.22 K/UL
EOSINOPHIL NFR BLD AUTO: 2.4 %
GGT SERPL-CCNC: 183 U/L
GLUCOSE SERPL-MCNC: 89 MG/DL
HCT VFR BLD CALC: 46.8 %
HGB BLD-MCNC: 15.2 G/DL
IMM GRANULOCYTES NFR BLD AUTO: 1.2 %
LPL SERPL-CCNC: 75 U/L
LYMPHOCYTES # BLD AUTO: 2.32 K/UL
LYMPHOCYTES NFR BLD AUTO: 25.1 %
MAN DIFF?: NORMAL
MCHC RBC-ENTMCNC: 30.2 PG
MCHC RBC-ENTMCNC: 32.5 GM/DL
MCV RBC AUTO: 93 FL
MONOCYTES # BLD AUTO: 0.58 K/UL
MONOCYTES NFR BLD AUTO: 6.3 %
NEUTROPHILS # BLD AUTO: 5.9 K/UL
NEUTROPHILS NFR BLD AUTO: 63.8 %
PLATELET # BLD AUTO: 357 K/UL
POTASSIUM SERPL-SCNC: 4.6 MMOL/L
PROT SERPL-MCNC: 7.2 G/DL
RBC # BLD: 5.03 M/UL
RBC # FLD: 13.4 %
SODIUM SERPL-SCNC: 140 MMOL/L
TSH SERPL-ACNC: 2.45 UIU/ML
WBC # FLD AUTO: 9.24 K/UL

## 2024-11-06 ENCOUNTER — RX RENEWAL (OUTPATIENT)
Age: 64
End: 2024-11-06

## 2024-12-06 ENCOUNTER — APPOINTMENT (OUTPATIENT)
Dept: GASTROENTEROLOGY | Facility: CLINIC | Age: 64
End: 2024-12-06

## 2024-12-23 ENCOUNTER — RX RENEWAL (OUTPATIENT)
Age: 64
End: 2024-12-23

## 2025-01-17 ENCOUNTER — APPOINTMENT (OUTPATIENT)
Dept: FAMILY MEDICINE | Facility: CLINIC | Age: 65
End: 2025-01-17
Payer: MEDICARE

## 2025-01-17 VITALS
SYSTOLIC BLOOD PRESSURE: 118 MMHG | HEIGHT: 67 IN | TEMPERATURE: 97.8 F | OXYGEN SATURATION: 98 % | WEIGHT: 208 LBS | DIASTOLIC BLOOD PRESSURE: 82 MMHG | HEART RATE: 62 BPM | BODY MASS INDEX: 32.65 KG/M2

## 2025-01-17 DIAGNOSIS — R74.8 ABNORMAL LEVELS OF OTHER SERUM ENZYMES: ICD-10-CM

## 2025-01-17 DIAGNOSIS — K21.9 GASTRO-ESOPHAGEAL REFLUX DISEASE W/OUT ESOPHAGITIS: ICD-10-CM

## 2025-01-17 DIAGNOSIS — M10.9 GOUT, UNSPECIFIED: ICD-10-CM

## 2025-01-17 PROCEDURE — 99214 OFFICE O/P EST MOD 30 MIN: CPT

## 2025-01-17 PROCEDURE — 36415 COLL VENOUS BLD VENIPUNCTURE: CPT

## 2025-01-17 RX ORDER — FAMOTIDINE 20 MG/1
20 TABLET, FILM COATED ORAL
Qty: 180 | Refills: 0 | Status: ACTIVE | COMMUNITY
Start: 2025-01-17 | End: 1900-01-01

## 2025-01-20 LAB
ALBUMIN SERPL ELPH-MCNC: 4.2 G/DL
ALP BLD-CCNC: 88 U/L
ALT SERPL-CCNC: 22 U/L
ANION GAP SERPL CALC-SCNC: 11 MMOL/L
AST SERPL-CCNC: 13 U/L
BILIRUB SERPL-MCNC: 0.3 MG/DL
BUN SERPL-MCNC: 22 MG/DL
CALCIUM SERPL-MCNC: 9 MG/DL
CHLORIDE SERPL-SCNC: 106 MMOL/L
CO2 SERPL-SCNC: 26 MMOL/L
CREAT SERPL-MCNC: 1.1 MG/DL
EGFR: 74 ML/MIN/1.73M2
GLUCOSE SERPL-MCNC: 85 MG/DL
LPL SERPL-CCNC: 44 U/L
POTASSIUM SERPL-SCNC: 4.4 MMOL/L
PROT SERPL-MCNC: 7.1 G/DL
SODIUM SERPL-SCNC: 142 MMOL/L
URATE SERPL-MCNC: 7.3 MG/DL

## 2025-02-06 RX ORDER — PANTOPRAZOLE 40 MG/1
40 TABLET, DELAYED RELEASE ORAL DAILY
Qty: 1 | Refills: 3 | Status: ACTIVE | COMMUNITY
Start: 2025-02-06 | End: 1900-01-01

## 2025-03-17 ENCOUNTER — NON-APPOINTMENT (OUTPATIENT)
Age: 65
End: 2025-03-17

## 2025-03-17 ENCOUNTER — APPOINTMENT (OUTPATIENT)
Dept: FAMILY MEDICINE | Facility: CLINIC | Age: 65
End: 2025-03-17
Payer: COMMERCIAL

## 2025-03-17 VITALS
HEIGHT: 67 IN | DIASTOLIC BLOOD PRESSURE: 80 MMHG | BODY MASS INDEX: 32.65 KG/M2 | SYSTOLIC BLOOD PRESSURE: 118 MMHG | WEIGHT: 208 LBS

## 2025-03-17 VITALS — HEART RATE: 57 BPM | OXYGEN SATURATION: 98 %

## 2025-03-17 DIAGNOSIS — R73.9 HYPERGLYCEMIA, UNSPECIFIED: ICD-10-CM

## 2025-03-17 DIAGNOSIS — E78.5 HYPERLIPIDEMIA, UNSPECIFIED: ICD-10-CM

## 2025-03-17 DIAGNOSIS — K21.9 GASTRO-ESOPHAGEAL REFLUX DISEASE W/OUT ESOPHAGITIS: ICD-10-CM

## 2025-03-17 DIAGNOSIS — N40.0 BENIGN PROSTATIC HYPERPLASIA WITHOUT LOWER URINARY TRACT SYMPMS: ICD-10-CM

## 2025-03-17 DIAGNOSIS — E61.1 IRON DEFICIENCY: ICD-10-CM

## 2025-03-17 PROCEDURE — 93000 ELECTROCARDIOGRAM COMPLETE: CPT | Mod: 59

## 2025-03-17 PROCEDURE — G0444 DEPRESSION SCREEN ANNUAL: CPT

## 2025-03-17 PROCEDURE — 36415 COLL VENOUS BLD VENIPUNCTURE: CPT

## 2025-03-17 PROCEDURE — G0439: CPT

## 2025-03-17 RX ORDER — METRONIDAZOLE 7.5 MG/G
0.75 CREAM TOPICAL
Qty: 45 | Refills: 0 | Status: COMPLETED | COMMUNITY
Start: 2025-02-10

## 2025-03-18 LAB
ALBUMIN SERPL ELPH-MCNC: 4.1 G/DL
ALP BLD-CCNC: 88 U/L
ALT SERPL-CCNC: 17 U/L
ANION GAP SERPL CALC-SCNC: 16 MMOL/L
APPEARANCE: CLEAR
AST SERPL-CCNC: 16 U/L
BACTERIA: NEGATIVE /HPF
BASOPHILS # BLD AUTO: 0.06 K/UL
BASOPHILS NFR BLD AUTO: 0.9 %
BILIRUB SERPL-MCNC: 0.3 MG/DL
BILIRUBIN URINE: NEGATIVE
BLOOD URINE: NEGATIVE
BUN SERPL-MCNC: 17 MG/DL
CALCIUM SERPL-MCNC: 9.3 MG/DL
CAST: 0 /LPF
CHLORIDE SERPL-SCNC: 104 MMOL/L
CHOLEST SERPL-MCNC: 188 MG/DL
CK SERPL-CCNC: 91 U/L
CO2 SERPL-SCNC: 21 MMOL/L
COLOR: YELLOW
CREAT SERPL-MCNC: 1.28 MG/DL
EGFRCR SERPLBLD CKD-EPI 2021: 61 ML/MIN/1.73M2
EOSINOPHIL # BLD AUTO: 0.11 K/UL
EOSINOPHIL NFR BLD AUTO: 1.7 %
EPITHELIAL CELLS: 0 /HPF
ESTIMATED AVERAGE GLUCOSE: 123 MG/DL
GGT SERPL-CCNC: 45 U/L
GLUCOSE QUALITATIVE U: NEGATIVE MG/DL
GLUCOSE SERPL-MCNC: 108 MG/DL
HBA1C MFR BLD HPLC: 5.9 %
HCT VFR BLD CALC: 49.1 %
HDLC SERPL-MCNC: 55 MG/DL
HGB BLD-MCNC: 15.9 G/DL
IMM GRANULOCYTES NFR BLD AUTO: 0.6 %
KETONES URINE: NEGATIVE MG/DL
LDLC SERPL-MCNC: 121 MG/DL
LEUKOCYTE ESTERASE URINE: NEGATIVE
LYMPHOCYTES # BLD AUTO: 1.87 K/UL
LYMPHOCYTES NFR BLD AUTO: 28.1 %
MAN DIFF?: NORMAL
MCHC RBC-ENTMCNC: 30.2 PG
MCHC RBC-ENTMCNC: 32.4 G/DL
MCV RBC AUTO: 93.2 FL
MICROSCOPIC-UA: NORMAL
MONOCYTES # BLD AUTO: 0.67 K/UL
MONOCYTES NFR BLD AUTO: 10.1 %
NEUTROPHILS # BLD AUTO: 3.9 K/UL
NEUTROPHILS NFR BLD AUTO: 58.6 %
NITRITE URINE: NEGATIVE
NONHDLC SERPL-MCNC: 134 MG/DL
PH URINE: 6.5
PLATELET # BLD AUTO: 180 K/UL
POTASSIUM SERPL-SCNC: 4.8 MMOL/L
PROT SERPL-MCNC: 7.2 G/DL
PROTEIN URINE: NEGATIVE MG/DL
PSA FREE FLD-MCNC: 26 %
PSA FREE SERPL-MCNC: 0.31 NG/ML
PSA SERPL-MCNC: 1.18 NG/ML
RBC # BLD: 5.27 M/UL
RBC # FLD: 13.5 %
RED BLOOD CELLS URINE: 0 /HPF
SODIUM SERPL-SCNC: 141 MMOL/L
SPECIFIC GRAVITY URINE: 1.01
T3FREE SERPL-MCNC: 3 PG/ML
T4 FREE SERPL-MCNC: 1.3 NG/DL
TRIGL SERPL-MCNC: 66 MG/DL
TSH SERPL-ACNC: 2.48 UIU/ML
URATE SERPL-MCNC: 7.8 MG/DL
UROBILINOGEN URINE: 0.2 MG/DL
WBC # FLD AUTO: 6.65 K/UL
WHITE BLOOD CELLS URINE: 0 /HPF

## 2025-03-31 ENCOUNTER — RX RENEWAL (OUTPATIENT)
Age: 65
End: 2025-03-31

## 2025-04-16 ENCOUNTER — RX RENEWAL (OUTPATIENT)
Age: 65
End: 2025-04-16

## 2025-04-22 ENCOUNTER — RX RENEWAL (OUTPATIENT)
Age: 65
End: 2025-04-22

## 2025-06-16 ENCOUNTER — APPOINTMENT (OUTPATIENT)
Dept: FAMILY MEDICINE | Facility: CLINIC | Age: 65
End: 2025-06-16